# Patient Record
Sex: MALE | Race: WHITE | NOT HISPANIC OR LATINO | Employment: OTHER | RURAL
[De-identification: names, ages, dates, MRNs, and addresses within clinical notes are randomized per-mention and may not be internally consistent; named-entity substitution may affect disease eponyms.]

---

## 2017-01-20 ENCOUNTER — LAB (OUTPATIENT)
Dept: FAMILY MEDICINE CLINIC | Facility: CLINIC | Age: 67
End: 2017-01-20

## 2017-01-20 DIAGNOSIS — Z23 NEED FOR PNEUMOCOCCAL VACCINATION: ICD-10-CM

## 2017-01-20 DIAGNOSIS — L71.9 ROSACEA: ICD-10-CM

## 2017-01-20 DIAGNOSIS — Z12.5 PROSTATE CANCER SCREENING: ICD-10-CM

## 2017-01-20 DIAGNOSIS — D69.6 THROMBOCYTOPENIA (HCC): ICD-10-CM

## 2017-01-20 DIAGNOSIS — E78.5 HYPERLIPIDEMIA: ICD-10-CM

## 2017-01-20 LAB
ALBUMIN SERPL-MCNC: 4.7 G/DL (ref 3.4–4.8)
ALBUMIN/GLOB SERPL: 1.8 G/DL (ref 1.5–2.5)
ALP SERPL-CCNC: 44 U/L (ref 46–116)
ALT SERPL W P-5'-P-CCNC: 32 U/L (ref 10–44)
ANION GAP SERPL CALCULATED.3IONS-SCNC: 12.2 MMOL/L (ref 3.6–11.2)
AST SERPL-CCNC: 36 U/L (ref 10–34)
BASOPHILS # BLD AUTO: 0.01 10*3/MM3 (ref 0–0.3)
BASOPHILS NFR BLD AUTO: 0.2 % (ref 0–2)
BILIRUB SERPL-MCNC: 0.8 MG/DL (ref 0.2–1.8)
BUN BLD-MCNC: 20 MG/DL (ref 7–21)
BUN/CREAT SERPL: 21.7 (ref 7–25)
CALCIUM SPEC-SCNC: 9.6 MG/DL (ref 7.7–10)
CHLORIDE SERPL-SCNC: 107 MMOL/L (ref 99–112)
CHOLEST SERPL-MCNC: 151 MG/DL (ref 0–200)
CO2 SERPL-SCNC: 28.8 MMOL/L (ref 24.3–31.9)
CREAT BLD-MCNC: 0.92 MG/DL (ref 0.43–1.29)
DEPRECATED RDW RBC AUTO: 45.1 FL (ref 37–54)
EOSINOPHIL # BLD AUTO: 0.16 10*3/MM3 (ref 0–0.7)
EOSINOPHIL NFR BLD AUTO: 3.2 % (ref 0–7)
ERYTHROCYTE [DISTWIDTH] IN BLOOD BY AUTOMATED COUNT: 12.8 % (ref 11.5–14.5)
GFR SERPL CREATININE-BSD FRML MDRD: 83 ML/MIN/1.73
GLOBULIN UR ELPH-MCNC: 2.6 GM/DL
GLUCOSE BLD-MCNC: 83 MG/DL (ref 70–110)
HCT VFR BLD AUTO: 42.5 % (ref 42–52)
HDLC SERPL-MCNC: 69 MG/DL (ref 60–100)
HGB BLD-MCNC: 13.9 G/DL (ref 14–18)
IMM GRANULOCYTES # BLD: 0.01 10*3/MM3 (ref 0–0.03)
IMM GRANULOCYTES NFR BLD: 0.2 % (ref 0–0.5)
LDLC SERPL CALC-MCNC: 71 MG/DL (ref 0–100)
LDLC/HDLC SERPL: 1.02 {RATIO}
LYMPHOCYTES # BLD AUTO: 1.5 10*3/MM3 (ref 1–3)
LYMPHOCYTES NFR BLD AUTO: 29.8 % (ref 16–46)
MCH RBC QN AUTO: 31.7 PG (ref 27–33)
MCHC RBC AUTO-ENTMCNC: 32.7 G/DL (ref 33–37)
MCV RBC AUTO: 97 FL (ref 80–94)
MONOCYTES # BLD AUTO: 0.42 10*3/MM3 (ref 0.1–0.9)
MONOCYTES NFR BLD AUTO: 8.3 % (ref 0–12)
NEUTROPHILS # BLD AUTO: 2.93 10*3/MM3 (ref 1.4–6.5)
NEUTROPHILS NFR BLD AUTO: 58.3 % (ref 40–75)
OSMOLALITY SERPL CALC.SUM OF ELEC: 296 MOSM/KG (ref 273–305)
PLATELET # BLD AUTO: 89 10*3/MM3 (ref 130–400)
PMV BLD AUTO: 11.7 FL (ref 6–10)
POTASSIUM BLD-SCNC: 4.1 MMOL/L (ref 3.5–5.3)
PROT SERPL-MCNC: 7.3 G/DL (ref 6–8)
PSA SERPL-MCNC: 0.77 NG/ML (ref 0–4)
RBC # BLD AUTO: 4.38 10*6/MM3 (ref 4.7–6.1)
SODIUM BLD-SCNC: 148 MMOL/L (ref 135–153)
TRIGL SERPL-MCNC: 57 MG/DL (ref 0–150)
TSH SERPL DL<=0.05 MIU/L-ACNC: 1.52 MIU/ML (ref 0.55–4.78)
VLDLC SERPL-MCNC: 11.4 MG/DL
WBC NRBC COR # BLD: 5.03 10*3/MM3 (ref 4.5–12.5)

## 2017-01-20 PROCEDURE — 84153 ASSAY OF PSA TOTAL: CPT | Performed by: FAMILY MEDICINE

## 2017-01-20 PROCEDURE — 84443 ASSAY THYROID STIM HORMONE: CPT | Performed by: FAMILY MEDICINE

## 2017-01-20 PROCEDURE — 80061 LIPID PANEL: CPT | Performed by: FAMILY MEDICINE

## 2017-01-20 PROCEDURE — 80053 COMPREHEN METABOLIC PANEL: CPT | Performed by: FAMILY MEDICINE

## 2017-01-20 PROCEDURE — 36415 COLL VENOUS BLD VENIPUNCTURE: CPT | Performed by: FAMILY MEDICINE

## 2017-01-20 PROCEDURE — 85025 COMPLETE CBC W/AUTO DIFF WBC: CPT | Performed by: FAMILY MEDICINE

## 2017-01-25 ENCOUNTER — OFFICE VISIT (OUTPATIENT)
Dept: FAMILY MEDICINE CLINIC | Facility: CLINIC | Age: 67
End: 2017-01-25

## 2017-01-25 VITALS
HEIGHT: 70 IN | WEIGHT: 169.5 LBS | DIASTOLIC BLOOD PRESSURE: 76 MMHG | SYSTOLIC BLOOD PRESSURE: 122 MMHG | HEART RATE: 79 BPM | BODY MASS INDEX: 24.26 KG/M2 | TEMPERATURE: 97.1 F

## 2017-01-25 DIAGNOSIS — D64.9 ANEMIA, UNSPECIFIED TYPE: ICD-10-CM

## 2017-01-25 DIAGNOSIS — Z00.00 INITIAL MEDICARE ANNUAL WELLNESS VISIT: Primary | ICD-10-CM

## 2017-01-25 DIAGNOSIS — L71.9 ROSACEA: ICD-10-CM

## 2017-01-25 DIAGNOSIS — Z12.5 SCREENING FOR PROSTATE CANCER: ICD-10-CM

## 2017-01-25 DIAGNOSIS — D69.6 THROMBOCYTOPENIA (HCC): ICD-10-CM

## 2017-01-25 DIAGNOSIS — Z23 NEED FOR INFLUENZA VACCINATION: ICD-10-CM

## 2017-01-25 DIAGNOSIS — E78.2 MIXED HYPERLIPIDEMIA: ICD-10-CM

## 2017-01-25 PROCEDURE — G0008 ADMIN INFLUENZA VIRUS VAC: HCPCS | Performed by: FAMILY MEDICINE

## 2017-01-25 PROCEDURE — 99214 OFFICE O/P EST MOD 30 MIN: CPT | Performed by: FAMILY MEDICINE

## 2017-01-25 PROCEDURE — 90686 IIV4 VACC NO PRSV 0.5 ML IM: CPT | Performed by: FAMILY MEDICINE

## 2017-01-25 PROCEDURE — 96160 PT-FOCUSED HLTH RISK ASSMT: CPT | Performed by: FAMILY MEDICINE

## 2017-01-25 PROCEDURE — G0438 PPPS, INITIAL VISIT: HCPCS | Performed by: FAMILY MEDICINE

## 2017-01-25 RX ORDER — DOCUSATE SODIUM 250 MG
240 CAPSULE ORAL DAILY
COMMUNITY

## 2017-01-25 RX ORDER — SIMVASTATIN 20 MG
20 TABLET ORAL NIGHTLY
Qty: 90 TABLET | Refills: 3 | Status: SHIPPED | OUTPATIENT
Start: 2017-01-25 | End: 2018-02-05 | Stop reason: SDUPTHER

## 2017-01-25 NOTE — MR AVS SNAPSHOT
Jem Crowder   1/25/2017 9:00 AM   Office Visit    Dept Phone:  [phone]   Encounter #:  [id number]    Provider:  Marek Khanna MD   Department:  [facility] FAMILY MEDICINE                Your Full Care Plan              Today's Medication Changes          These changes are accurate as of: 1/25/17 10:10 AM.  If you have any questions, ask your nurse or doctor.               Medication(s)that have changed:     metroNIDAZOLE 0.75 % cream   Commonly known as:  METROCREAM   Apply  topically 2 (Two) Times a Day.   What changed:  when to take this   Changed by:  Marek Khanna MD            Where to Get Your Medications      These medications were sent to [place], [place] - [address] - [phone] [place] [phone]   [address] [facility] [address]     Phone:  [phone]     metroNIDAZOLE 0.75 % cream    simvastatin 20 MG tablet                  Your Updated Medication List          This list is accurate as of: 1/25/17 10:10 AM.  Always use your most recent med list.                aspirin 81 MG tablet       BENADRYL 25 MG tablet   Generic drug:  diphenhydrAMINE       docusate sodium 250 MG capsule   Commonly known as:  COLACE       metroNIDAZOLE 0.75 % cream   Commonly known as:  METROCREAM   Apply  topically 2 (Two) Times a Day.       MULTI VITAMIN MENS tablet       simvastatin 20 MG tablet   Commonly known as:  ZOCOR   Take 1 tablet by mouth Every Night.               We Performed the Following     Ambulatory Referral to Hematology     Flu Vaccine Greater Than or Equal To 4yo Preservative Free IM       You Were Diagnosed With        Codes Comments    Need for influenza vaccination    -  Primary ICD-10-CM: Z23  ICD-9-CM: V04.81     Mixed hyperlipidemia     ICD-10-CM: E78.2  ICD-9-CM: 272.2     Rosacea     ICD-10-CM: L71.9  ICD-9-CM: 695.3     Screening for prostate cancer     ICD-10-CM: Z12.5  ICD-9-CM: V76.44     "Thrombocytopenia     ICD-10-CM: D69.6  ICD-9-CM: 287.5     Anemia, unspecified type     ICD-10-CM: D64.9  ICD-9-CM: 285.9       Instructions     None    Patient Instructions History      Upcoming Appointments     Visit Type Date Time Department    FOLLOW UP 1/25/2017  9:00 AM [facility]    LAB 7/21/2017  8:40 AM [facility]    [facility]      MyChart Signup     Our records indicate that you have declined [facility] PROnoisehart signup. If you would like to sign up for PROnoisehart, please email [email or web address] or call [phone] to obtain an activation code.             Other Info from Your Visit           Your Appointments     Jul 21, 2017  8:40 AM EDT   Lab with LAB KIANNA DAVENPORT   [facility] (--)    [address] [address]   [phone]            Jul 26, 2017  9:00 AM EDT   Follow Up with Marek Khanna MD   [facility] (--)    [address] [address]   [phone]           Arrive 15 minutes prior to appointment.              Allergies     Penicillins        Reason for Visit     Hyperlipidemia 6 month follow up    Med Refill Simvastatin, Metronidazol      Vital Signs     Blood Pressure Pulse Temperature Height Weight Body Mass Index    122/76 (BP Location: Left arm, Patient Position: Sitting) 79 97.1 °F (36.2 °C) (Oral) 70\" (177.8 cm) 169 lb 8 oz (76.9 kg) 24.32 kg/m2    Smoking Status                   Never Smoker           Problems and Diagnoses Noted     High cholesterol or triglycerides    Acne rosacea    Decreased platelet count    Needs flu shot    -  Primary    Screening for prostate cancer        Anemia          Immunizations Administered     Name Date    Influenza (IM) Preservative Free         "

## 2017-01-25 NOTE — PROGRESS NOTES
Subjective   Jem Crowder is a 66 y.o. male.     History of Present Illness comes in regarding medication management follow-up on recent laboratory.  Hyperlipidemia.  Generally having no new problems.  Trying to stay active.  Is compliant with diet.  Has had some intentional weight loss.  Globally doing really well.    The following portions of the patient's history were reviewed and updated as appropriate: allergies, current medications, past medical history, past social history, past surgical history and problem list.    Review of Systems   Constitutional: Negative.    HENT: Negative.    Eyes: Negative.    Respiratory: Negative.    Cardiovascular: Negative.    Gastrointestinal: Negative.    Endocrine: Negative.    Genitourinary: Negative.    Musculoskeletal: Negative.    Skin: Negative.    Allergic/Immunologic: Negative.    Neurological: Negative.    Hematological: Negative.    Psychiatric/Behavioral: Negative.        Objective   Physical Exam   Constitutional: He is oriented to person, place, and time. He appears well-developed and well-nourished.   HENT:   Head: Normocephalic.   Right Ear: External ear normal.   Left Ear: External ear normal.   Mouth/Throat: Oropharynx is clear and moist.   Eyes: Conjunctivae and EOM are normal. Pupils are equal, round, and reactive to light.   Neck: Normal range of motion. Neck supple. No tracheal deviation present. No thyromegaly present.   Cardiovascular: Normal rate, regular rhythm, normal heart sounds and intact distal pulses.    No murmur heard.  Pulmonary/Chest: Effort normal and breath sounds normal.   Stable kyphosis     Abdominal: Soft. Bowel sounds are normal. There is no tenderness.   Genitourinary: Rectum normal and prostate normal.   Musculoskeletal: Normal range of motion. He exhibits no edema.   Lymphadenopathy:     He has no cervical adenopathy.   Neurological: He is alert and oriented to person, place, and time.   Skin: Skin is warm and dry.   Psychiatric: He  "has a normal mood and affect.   Vitals reviewed.    Visit Vitals   • /76 (BP Location: Left arm, Patient Position: Sitting)   • Pulse 79   • Temp 97.1 °F (36.2 °C) (Oral)   • Ht 70\" (177.8 cm)   • Wt 169 lb 8 oz (76.9 kg)   • BMI 24.32 kg/m2     Assessment/Plan   Problems Addressed this Visit        Cardiovascular and Mediastinum    Hyperlipidemia    Relevant Medications    simvastatin (ZOCOR) 20 MG tablet    Other Relevant Orders    Lipid Panel       Musculoskeletal and Integument    Rosacea       Hematopoietic and Hemostatic    Thrombocytopenia    Relevant Orders    Ambulatory Referral to Hematology    CBC & Differential    Comprehensive Metabolic Panel      Other Visit Diagnoses     Need for influenza vaccination    -  Primary    Relevant Orders    Flu Vaccine Greater Than or Equal To 4yo Preservative Free IM (Completed)    Screening for prostate cancer        Anemia, unspecified type        Relevant Orders    Comprehensive Metabolic Panel        We reviewed the recent metabolic parameters.  Those are acceptable.  CBC noted with a mildly progressive thrombocytopenia.  Very mild anemia also.  Overall otherwise things seem to be stable.  We'll continue all medications as currently ordered reconciled.  Will go ahead and refer back to hematology for another evaluation follow-up especially in light of the mild progression with the mild anemia.  Recheck here in roughly 6 months with lab prior for follow-up metabolic monitoring.  Otherwise as needed.         "

## 2017-01-25 NOTE — PROGRESS NOTES
QUICK REFERENCE INFORMATION:  The ABCs of the Annual Wellness Visit    Initial Medicare Wellness Visit    HEALTH RISK ASSESSMENT    Recent Hospitalizations:  No recent hospitalization(s)..        Current Medical Providers:  Patient Care Team:  Marek Khanna MD as PCP - General  No Known Provider as PCP - Family Medicine        Smoking Status:  History   Smoking Status   • Never Smoker   Smokeless Tobacco   • Former User   • Types: Chew   • Quit date: 7/21/2000       Alcohol Consumption:  History   Alcohol Use No       Depression Screen:   PHQ-9 Depression Screening 1/25/2017   Little interest or pleasure in doing things 0   Feeling down, depressed, or hopeless 0   Trouble falling or staying asleep, or sleeping too much 0   Feeling tired or having little energy 0   Poor appetite or overeating 0   Feeling bad about yourself - or that you are a failure or have let yourself or your family down 0   Trouble concentrating on things, such as reading the newspaper or watching television 0   Moving or speaking so slowly that other people could have noticed. Or the opposite - being so fidgety or restless that you have been moving around a lot more than usual 0   Thoughts that you would be better off dead, or of hurting yourself in some way 0   PHQ-9 Total Score 0       Health Habits and Functional and Cognitive Screening:  Functional & Cognitive Status 1/25/2017   Do you have difficulty preparing food and eating? No   Do you have difficulty bathing yourself? No   Do you have difficulty getting dressed? No   Do you have difficulty using the toilet? No   Do you have difficulty moving around from place to place? No   In the past year have you fallen or experienced a near fall? No   Do you need help using the phone?  No   Are you deaf or do you have serious difficulty hearing?  No   Do you need help with transportation? No   Do you need help shopping? No   Do you need help preparing meals?  No   Do you need help with  housework?  No   Do you need help with laundry? No   Do you need help taking your medications? No   Do you need help managing money? No   Do you have difficulty concentrating, remembering or making decisions? No                  Does the patient have evidence of cognitive impairment? No    Asprin use counseling:yes    Finger Rub Hearing Test (right ear):passed  Finger Rub Hearing Test (left ear):passed    Recent Lab Results:    Visual Acuity:  No exam data present    Age-appropriate Screening Schedule:  Refer to the list below for future screening recommendations based on patient's age, sex and/or medical conditions. Orders for these recommended tests are listed in the plan section. The patient has been provided with a written plan.    Health Maintenance   Topic Date Due   • INFLUENZA VACCINE  08/01/2016   • PNEUMOCOCCAL VACCINES (65+ LOW/MEDIUM RISK) (2 of 2 - PPSV23) 07/21/2017   • LIPID PANEL  01/20/2018   • TDAP/TD VACCINES (2 - Td) 07/19/2022   • COLONOSCOPY  10/01/2025   • ZOSTER VACCINE  Completed        Subjective   History of Present Illness    Jem Crowder is a 66 y.o. male who presents for an Annual Wellness Visit. In addition, we addressed the following health issues:     The following portions of the patient's history were reviewed and updated as appropriate: allergies, current medications, past medical history, past social history, past surgical history and problem list.    Outpatient Medications Prior to Visit   Medication Sig Dispense Refill   • aspirin 81 MG tablet Take 81 mg by mouth daily.     • diphenhydrAMINE (BENADRYL) 25 MG tablet Take 50 mg by mouth at night as needed for itching or sleep.     • Multiple Vitamin (MULTI VITAMIN MENS) tablet Take 1 tablet by mouth daily.     • metroNIDAZOLE (METROCREAM) 0.75 % cream Apply  topically.     • simvastatin (ZOCOR) 20 MG tablet Take 20 mg by mouth every night.       No facility-administered medications prior to visit.        Patient Active Problem  "List   Diagnosis   • Chronic coronary artery disease   • Diverticulosis of sigmoid colon   • Hyperlipidemia   • Internal hemorrhoids   • Rosacea   • Thrombocytopenia       Advanced Care Planning:  has an advanced directive - a copy has been provided and is in file    Identification of Risk Factors:  Risk factors include: none.    Review of Systems    Compared to one year ago, the patient feels his physical health is the same.  Compared to one year ago, the patient feels his mental health is the same.    Objective     Physical Exam    Vitals:    01/25/17 0906   BP: 122/76   BP Location: Left arm   Patient Position: Sitting   Pulse: 79   Temp: 97.1 °F (36.2 °C)   TempSrc: Oral   Weight: 169 lb 8 oz (76.9 kg)   Height: 70\" (177.8 cm)       Body mass index is 24.32 kg/(m^2).  Discussed the patient's BMI with him. The BMI is in the acceptable range.    Assessment/Plan   Patient Self-Management and Personalized Health Advice  The patient has been provided with information about: n/a and preventive services including:   · Influenza vaccine, Nutrition counseling provided, Prostate cancer screening discussed.    Visit Diagnoses:    ICD-10-CM ICD-9-CM   1. Need for influenza vaccination Z23 V04.81   2. Mixed hyperlipidemia E78.2 272.2   3. Rosacea L71.9 695.3   4. Screening for prostate cancer Z12.5 V76.44   5. Thrombocytopenia D69.6 287.5   6. Anemia, unspecified type D64.9 285.9       Orders Placed This Encounter   Procedures   • Flu Vaccine Greater Than or Equal To 4yo Preservative Free IM   • Comprehensive Metabolic Panel     Standing Status:   Future     Standing Expiration Date:   1/25/2018   • Lipid Panel     Standing Status:   Future     Standing Expiration Date:   1/25/2018   • Ambulatory Referral to Hematology     Referral Priority:   Routine     Referral Type:   Consultation     Referral Reason:   Specialty Services Required     Requested Specialty:   Hematology     Number of Visits Requested:   1       Outpatient " Encounter Prescriptions as of 1/25/2017   Medication Sig Dispense Refill   • aspirin 81 MG tablet Take 81 mg by mouth daily.     • diphenhydrAMINE (BENADRYL) 25 MG tablet Take 50 mg by mouth at night as needed for itching or sleep.     • docusate sodium (COLACE) 250 MG capsule Take 250 mg by mouth Daily.     • metroNIDAZOLE (METROCREAM) 0.75 % cream Apply  topically 2 (Two) Times a Day. 45 g 3   • Multiple Vitamin (MULTI VITAMIN MENS) tablet Take 1 tablet by mouth daily.     • simvastatin (ZOCOR) 20 MG tablet Take 1 tablet by mouth Every Night. 90 tablet 3   • [DISCONTINUED] metroNIDAZOLE (METROCREAM) 0.75 % cream Apply  topically.     • [DISCONTINUED] simvastatin (ZOCOR) 20 MG tablet Take 20 mg by mouth every night.       No facility-administered encounter medications on file as of 1/25/2017.        Reviewed use of high risk medication in the elderly: not applicable  Reviewed for potential of harmful drug interactions in the elderly: yes    Follow Up:  Return in about 6 months (around 7/25/2017) for Recheck lab prior.     An After Visit Summary and PPPS with all of these plans were given to the patient.

## 2017-01-26 ENCOUNTER — TELEPHONE (OUTPATIENT)
Dept: FAMILY MEDICINE CLINIC | Facility: CLINIC | Age: 67
End: 2017-01-26

## 2017-01-26 RX ORDER — METRONIDAZOLE 7.5 MG/G
GEL TOPICAL 2 TIMES DAILY
Qty: 45 G | Refills: 2 | Status: SHIPPED | OUTPATIENT
Start: 2017-01-26 | End: 2020-08-24 | Stop reason: SDUPTHER

## 2017-01-26 NOTE — TELEPHONE ENCOUNTER
PATIENT STATES HE NORMALY USEDS METRONIDAZOLE GEL BUT THE CREAM WAS SENT TO PHARMACY. JUST WONDERING IF YOU WERE WANTING TO CHANGE IT? HE PREFERS THE GEL.

## 2017-01-30 ENCOUNTER — RESULTS ENCOUNTER (OUTPATIENT)
Dept: FAMILY MEDICINE CLINIC | Facility: CLINIC | Age: 67
End: 2017-01-30

## 2017-01-30 DIAGNOSIS — D69.6 THROMBOCYTOPENIA (HCC): ICD-10-CM

## 2017-01-30 DIAGNOSIS — D64.9 ANEMIA, UNSPECIFIED TYPE: ICD-10-CM

## 2017-01-30 DIAGNOSIS — E78.2 MIXED HYPERLIPIDEMIA: ICD-10-CM

## 2017-03-13 ENCOUNTER — CONSULT (OUTPATIENT)
Dept: ONCOLOGY | Facility: CLINIC | Age: 67
End: 2017-03-13

## 2017-03-13 ENCOUNTER — LAB (OUTPATIENT)
Dept: ONCOLOGY | Facility: CLINIC | Age: 67
End: 2017-03-13

## 2017-03-13 VITALS
BODY MASS INDEX: 24.65 KG/M2 | RESPIRATION RATE: 18 BRPM | WEIGHT: 172.2 LBS | HEART RATE: 77 BPM | OXYGEN SATURATION: 98 % | SYSTOLIC BLOOD PRESSURE: 109 MMHG | TEMPERATURE: 97.5 F | DIASTOLIC BLOOD PRESSURE: 66 MMHG | HEIGHT: 70 IN

## 2017-03-13 DIAGNOSIS — D69.6 THROMBOCYTOPENIA (HCC): ICD-10-CM

## 2017-03-13 DIAGNOSIS — D69.6 THROMBOCYTOPENIA (HCC): Primary | ICD-10-CM

## 2017-03-13 LAB
BASOPHILS # BLD AUTO: 0.01 10*3/MM3 (ref 0–0.3)
BASOPHILS NFR BLD AUTO: 0.2 % (ref 0–2)
CRP SERPL-MCNC: <0.5 MG/DL (ref 0–0.99)
DEPRECATED RDW RBC AUTO: 44.3 FL (ref 37–54)
EOSINOPHIL # BLD AUTO: 0.07 10*3/MM3 (ref 0–0.7)
EOSINOPHIL NFR BLD AUTO: 1.6 % (ref 0–7)
ERYTHROCYTE [DISTWIDTH] IN BLOOD BY AUTOMATED COUNT: 12.6 % (ref 11.5–14.5)
ERYTHROCYTE [SEDIMENTATION RATE] IN BLOOD: 7 MM/HR (ref 0–20)
FOLATE SERPL-MCNC: >24 NG/ML (ref 5.4–20)
HCT VFR BLD AUTO: 41.1 % (ref 42–52)
HGB BLD-MCNC: 13.7 G/DL (ref 14–18)
IMM GRANULOCYTES # BLD: 0 10*3/MM3 (ref 0–0.03)
IMM GRANULOCYTES NFR BLD: 0 % (ref 0–0.5)
LYMPHOCYTES # BLD AUTO: 1.39 10*3/MM3 (ref 1–3)
LYMPHOCYTES NFR BLD AUTO: 32.2 % (ref 16–46)
MCH RBC QN AUTO: 32.5 PG (ref 27–33)
MCHC RBC AUTO-ENTMCNC: 33.3 G/DL (ref 33–37)
MCV RBC AUTO: 97.4 FL (ref 80–94)
MONOCYTES # BLD AUTO: 0.35 10*3/MM3 (ref 0.1–0.9)
MONOCYTES NFR BLD AUTO: 8.1 % (ref 0–12)
NEUTROPHILS # BLD AUTO: 2.5 10*3/MM3 (ref 1.4–6.5)
NEUTROPHILS NFR BLD AUTO: 57.9 % (ref 40–75)
PLATELET # BLD AUTO: 188 10*3/MM3 (ref 130–400)
PLATELET # BLD AUTO: 206 10*3/MM3 (ref 130–400)
PLATELET # BLD AUTO: 88 10*3/MM3 (ref 130–400)
PMV BLD AUTO: 10.3 FL (ref 6–10)
RBC # BLD AUTO: 4.22 10*6/MM3 (ref 4.7–6.1)
VIT B12 BLD-MCNC: 553 PG/ML (ref 211–911)
WBC NRBC COR # BLD: 4.32 10*3/MM3 (ref 4.5–12.5)

## 2017-03-13 PROCEDURE — 99204 OFFICE O/P NEW MOD 45 MIN: CPT | Performed by: INTERNAL MEDICINE

## 2017-03-13 PROCEDURE — 82607 VITAMIN B-12: CPT | Performed by: INTERNAL MEDICINE

## 2017-03-13 PROCEDURE — 85652 RBC SED RATE AUTOMATED: CPT | Performed by: INTERNAL MEDICINE

## 2017-03-13 PROCEDURE — 36415 COLL VENOUS BLD VENIPUNCTURE: CPT | Performed by: INTERNAL MEDICINE

## 2017-03-13 PROCEDURE — 86038 ANTINUCLEAR ANTIBODIES: CPT | Performed by: INTERNAL MEDICINE

## 2017-03-13 PROCEDURE — 85060 BLOOD SMEAR INTERPRETATION: CPT | Performed by: INTERNAL MEDICINE

## 2017-03-13 PROCEDURE — 86140 C-REACTIVE PROTEIN: CPT | Performed by: INTERNAL MEDICINE

## 2017-03-13 PROCEDURE — 82746 ASSAY OF FOLIC ACID SERUM: CPT | Performed by: INTERNAL MEDICINE

## 2017-03-13 PROCEDURE — 85025 COMPLETE CBC W/AUTO DIFF WBC: CPT | Performed by: INTERNAL MEDICINE

## 2017-03-13 PROCEDURE — 85049 AUTOMATED PLATELET COUNT: CPT | Performed by: INTERNAL MEDICINE

## 2017-03-13 NOTE — PROGRESS NOTES
Jem Crowder  [id number]  1950  3/13/2017      Referring Provider:   Marek Khanna    Reason for Consultation:   Thrombocytopenia     Chief Complaint:  Thrombocytopenia      History of Present Illness:  Jem Crowder is a very pleasant 66 y.o.  male who presents in new consultation at the request of Marek Khanna, for further management of thrombocytopenia.     Mr. Crowder reports that he has had thrombocytopenia for some time and was in fact evaluated by hematologist Dr. Mayfield in 2014. At that time he was on daily doxycycline for acne rosacea and it was felt that his thrombocytopenia could be monitored. He states that he was previously on Lipitor for elevated cholesterol and was later changed to simvastatin due to insurance issues. He believes that his thrombocytopenia began when he started simvastatin. He does take a daily multivitamin otherwise he denies of any NSAID with exception of 81mg of Aspirin daily. He also denies of any other vitamin or herbal medication use. He does report easy bruising however he is on Asprin 81 mg daily and denies of any abnormal or spontaneous bleeding. He denies of any history of blood transfusion. He denies of any fevers, weight loss, night sweats or lymphadenopathy. He reports feeling well otherwise. In review of his complete blood counts in our system it appears that this has been occurring since 1/2015 and in fact normalized in 8/2015 and again began occurring in 1/2016. The platelet counts during that period have ranged from 77 to 199 thousand.       The following portions of the patient's history were reviewed and updated as appropriate: allergies, current medications, past family history, past medical history, past social history, past surgical history and problem list.    Allergies   Allergen Reactions   • Penicillins        Past Medical History   Diagnosis Date   • Coronary artery disease    • Diverticulitis    • Rosacea        Past Surgical  History   Procedure Laterality Date   • Colonoscopy  10/01/2015       Social History     Social History   • Marital status:      Spouse name: N/A   • Number of children: N/A   • Years of education: N/A     Occupational History   • Not on file.     Social History Main Topics   • Smoking status: Never Smoker   • Smokeless tobacco: Former User     Types: Chew     Quit date: 7/21/2000   • Alcohol use No   • Drug use: No   • Sexual activity: Not on file     Other Topics Concern   • Not on file     Social History Narrative   He currently lives by himself he does have one son who lives in [place]. He is currently retired.    Family History   Problem Relation Age of Onset   • Heart disease Mother    • Hypertension Mother    • Lung disease Father      Black Lung   • Stomach cancer Father    • Cancer Brother          Current Outpatient Prescriptions:   •  aspirin 81 MG tablet, Take 81 mg by mouth daily., Disp: , Rfl:   •  diphenhydrAMINE (BENADRYL) 25 MG tablet, Take 50 mg by mouth at night as needed for itching or sleep., Disp: , Rfl:   •  docusate sodium (COLACE) 250 MG capsule, Take 250 mg by mouth Daily., Disp: , Rfl:   •  metroNIDAZOLE (METROGEL) 0.75 % gel, Apply  topically 2 (Two) Times a Day., Disp: 45 g, Rfl: 2  •  Multiple Vitamin (MULTI VITAMIN MENS) tablet, Take 1 tablet by mouth daily., Disp: , Rfl:   •  simvastatin (ZOCOR) 20 MG tablet, Take 1 tablet by mouth Every Night., Disp: 90 tablet, Rfl: 3        Review of Systems  A comprehensive 14 point review of systems was conducted with patient and positive as per HPI otherwise negative. +occasional seasonal allergies and intermittent sinus drainage      Physical Exam  Vital Signs: These were reviewed and listed as per patient’s electronic medical chart  Vitals:    03/13/17 1346   BP: 109/66   Pulse: 77   Resp: 18   Temp: 97.5 °F (36.4 °C)   SpO2: 98%     General: Awake, alert and oriented, in no distress  HEENT: Head is atraumatic, normocephalic,  extraocular movements full, oropharynx clear, no scleral icterus, pink moist mucous membranes  Neck: supple, no jvd, lymphadenopathy or masses  Cardiovascular: regular rate and rhythm without murmurs, rubs or gallops  Pulmonary: non-labored, clear to auscultation bilaterally, no wheezing  Abdomen: soft, non-tender, non-distended, normal active bowel sounds present, no organomegaly  Extremities: No clubbing, cyanosis or edema  Lymph: No cervical, supraclavicular, axillary, adenopathy  Neurologic: Mental status as above, alert, awake and oriented, grossly non-focal exam  Skin: warm, dry, intact        Labs / Studies:    Consult on 03/13/2017   Component Date Value   • Vitamin B-12 03/13/2017 553    • C-Reactive Protein 03/13/2017 <0.50    • Sed Rate 03/13/2017 7    • Folate 03/13/2017 >24.00*   • Platelets 03/13/2017 88*   • Platelets 03/13/2017 188    • WBC 03/13/2017 4.32*   • RBC 03/13/2017 4.22*   • Hemoglobin 03/13/2017 13.7*   • Hematocrit 03/13/2017 41.1*   • MCV 03/13/2017 97.4*   • MCH 03/13/2017 32.5    • MCHC 03/13/2017 33.3    • RDW 03/13/2017 12.6    • RDW-SD 03/13/2017 44.3    • MPV 03/13/2017 10.3*   • Platelets 03/13/2017 206    • Neutrophil % 03/13/2017 57.9    • Lymphocyte % 03/13/2017 32.2    • Monocyte % 03/13/2017 8.1    • Eosinophil % 03/13/2017 1.6    • Basophil % 03/13/2017 0.2    • Immature Grans % 03/13/2017 0.0    • Neutrophils, Absolute 03/13/2017 2.50    • Lymphocytes, Absolute 03/13/2017 1.39    • Monocytes, Absolute 03/13/2017 0.35    • Eosinophils, Absolute 03/13/2017 0.07    • Basophils, Absolute 03/13/2017 0.01    • Immature Grans, Absolute 03/13/2017 0.00    Lab on 01/20/2017   Component Date Value   • Glucose 01/20/2017 83    • BUN 01/20/2017 20    • Creatinine 01/20/2017 0.92    • Sodium 01/20/2017 148    • Potassium 01/20/2017 4.1    • Chloride 01/20/2017 107    • CO2 01/20/2017 28.8    • Calcium 01/20/2017 9.6    • Total Protein 01/20/2017 7.3    • Albumin 01/20/2017 4.70    •  ALT (SGPT) 01/20/2017 32    • AST (SGOT) 01/20/2017 36*   • Alkaline Phosphatase 01/20/2017 44*   • Total Bilirubin 01/20/2017 0.8    • eGFR Non  Amer 01/20/2017 83    • Globulin 01/20/2017 2.6    • A/G Ratio 01/20/2017 1.8    • BUN/Creatinine Ratio 01/20/2017 21.7    • Anion Gap 01/20/2017 12.2*   • Total Cholesterol 01/20/2017 151    • Triglycerides 01/20/2017 57    • HDL Cholesterol 01/20/2017 69    • LDL Cholesterol  01/20/2017 71    • VLDL Cholesterol 01/20/2017 11.4    • LDL/HDL Ratio 01/20/2017 1.02    • PSA 01/20/2017 0.770    • TSH 01/20/2017 1.517    • WBC 01/20/2017 5.03    • RBC 01/20/2017 4.38*   • Hemoglobin 01/20/2017 13.9*   • Hematocrit 01/20/2017 42.5    • MCV 01/20/2017 97.0*   • MCH 01/20/2017 31.7    • MCHC 01/20/2017 32.7*   • RDW 01/20/2017 12.8    • RDW-SD 01/20/2017 45.1    • MPV 01/20/2017 11.7*   • Platelets 01/20/2017 89*   • Neutrophil % 01/20/2017 58.3    • Lymphocyte % 01/20/2017 29.8    • Monocyte % 01/20/2017 8.3    • Eosinophil % 01/20/2017 3.2    • Basophil % 01/20/2017 0.2    • Immature Grans % 01/20/2017 0.2    • Neutrophils, Absolute 01/20/2017 2.93    • Lymphocytes, Absolute 01/20/2017 1.50    • Monocytes, Absolute 01/20/2017 0.42    • Eosinophils, Absolute 01/20/2017 0.16    • Basophils, Absolute 01/20/2017 0.01    • Immature Grans, Absolute 01/20/2017 0.01    • Osmolality Calc 01/20/2017 296.0               Assessment/Plan   Jem Crowder is a very pleasant 66 y.o.  male who presents in new consultation at the request of Marek Khanna, for further management of thrombocytopenia.     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 I did also obtain a peripheral smear for further evaluation which is pending. I did  also assess for nutritional deficiencies that may be contributing such as B12 and folate which were normal. I will also assess for underlying inflammation which may be suppressing bone marrow which were also normal. Patient was also found to have some leukopenia will recheck complete blood count during his next visit. Given that thrombocytopenia appears to be resolved will continue to monitor.    I will have the patient return in follow up appointment to review test results in one month. He understands that should he have any questions or concerns prior to his appointment he should give us a call at any time and I would be happy to see him sooner. It was a pleasure to see this patient in clinic today, thank you for allowing me to participate in the care of this patient.    I spent 45 minutes in regards to this patient’s care today. More than 30 minutes of the time was spent in direct interaction with the patient for the above problems.        Pat Hooks MD  03/13/2017  5:22 PM

## 2017-03-14 LAB — ANA SER QL: NEGATIVE

## 2017-03-16 LAB
CYTOLOGIST CVX/VAG CYTO: NORMAL
PATH INTERP BLD-IMP: NORMAL

## 2017-04-11 ENCOUNTER — OFFICE VISIT (OUTPATIENT)
Dept: ONCOLOGY | Facility: CLINIC | Age: 67
End: 2017-04-11

## 2017-04-11 VITALS
WEIGHT: 169.7 LBS | DIASTOLIC BLOOD PRESSURE: 70 MMHG | TEMPERATURE: 97.2 F | BODY MASS INDEX: 24.35 KG/M2 | SYSTOLIC BLOOD PRESSURE: 132 MMHG | HEART RATE: 61 BPM | RESPIRATION RATE: 20 BRPM | OXYGEN SATURATION: 99 %

## 2017-04-11 DIAGNOSIS — D69.6 THROMBOCYTOPENIA (HCC): Primary | ICD-10-CM

## 2017-04-11 DIAGNOSIS — D72.819 LEUKOPENIA, UNSPECIFIED TYPE: ICD-10-CM

## 2017-04-11 DIAGNOSIS — R25.2 CRAMP OF BOTH LOWER EXTREMITIES: ICD-10-CM

## 2017-04-11 LAB
ANION GAP SERPL CALCULATED.3IONS-SCNC: 3.3 MMOL/L (ref 3.6–11.2)
BASOPHILS # BLD AUTO: 0.01 10*3/MM3 (ref 0–0.3)
BASOPHILS NFR BLD AUTO: 0.2 % (ref 0–2)
BUN BLD-MCNC: 20 MG/DL (ref 7–21)
BUN/CREAT SERPL: 23 (ref 7–25)
CALCIUM SPEC-SCNC: 9.6 MG/DL (ref 7.7–10)
CHLORIDE SERPL-SCNC: 106 MMOL/L (ref 99–112)
CO2 SERPL-SCNC: 31.7 MMOL/L (ref 24.3–31.9)
CREAT BLD-MCNC: 0.87 MG/DL (ref 0.43–1.29)
DEPRECATED RDW RBC AUTO: 45.3 FL (ref 37–54)
EOSINOPHIL # BLD AUTO: 0.11 10*3/MM3 (ref 0–0.7)
EOSINOPHIL NFR BLD AUTO: 2.4 % (ref 0–7)
ERYTHROCYTE [DISTWIDTH] IN BLOOD BY AUTOMATED COUNT: 12.8 % (ref 11.5–14.5)
GFR SERPL CREATININE-BSD FRML MDRD: 88 ML/MIN/1.73
GLUCOSE BLD-MCNC: 96 MG/DL (ref 70–110)
HCT VFR BLD AUTO: 42.1 % (ref 42–52)
HGB BLD-MCNC: 13.9 G/DL (ref 14–18)
IMM GRANULOCYTES # BLD: 0.01 10*3/MM3 (ref 0–0.03)
IMM GRANULOCYTES NFR BLD: 0.2 % (ref 0–0.5)
LYMPHOCYTES # BLD AUTO: 1.5 10*3/MM3 (ref 1–3)
LYMPHOCYTES NFR BLD AUTO: 32.5 % (ref 16–46)
MAGNESIUM SERPL-MCNC: 2.1 MG/DL (ref 1.7–2.6)
MCH RBC QN AUTO: 32 PG (ref 27–33)
MCHC RBC AUTO-ENTMCNC: 33 G/DL (ref 33–37)
MCV RBC AUTO: 97 FL (ref 80–94)
MONOCYTES # BLD AUTO: 0.33 10*3/MM3 (ref 0.1–0.9)
MONOCYTES NFR BLD AUTO: 7.1 % (ref 0–12)
NEUTROPHILS # BLD AUTO: 2.66 10*3/MM3 (ref 1.4–6.5)
NEUTROPHILS NFR BLD AUTO: 57.6 % (ref 40–75)
OSMOLALITY SERPL CALC.SUM OF ELEC: 283.7 MOSM/KG (ref 273–305)
PLATELET # BLD AUTO: 212 10*3/MM3 (ref 130–400)
PMV BLD AUTO: 10.2 FL (ref 6–10)
POTASSIUM BLD-SCNC: 4 MMOL/L (ref 3.5–5.3)
RBC # BLD AUTO: 4.34 10*6/MM3 (ref 4.7–6.1)
SODIUM BLD-SCNC: 141 MMOL/L (ref 135–153)
WBC NRBC COR # BLD: 4.62 10*3/MM3 (ref 4.5–12.5)

## 2017-04-11 PROCEDURE — 83735 ASSAY OF MAGNESIUM: CPT | Performed by: INTERNAL MEDICINE

## 2017-04-11 PROCEDURE — 80048 BASIC METABOLIC PNL TOTAL CA: CPT | Performed by: INTERNAL MEDICINE

## 2017-04-11 PROCEDURE — 99214 OFFICE O/P EST MOD 30 MIN: CPT | Performed by: INTERNAL MEDICINE

## 2017-04-11 PROCEDURE — 85025 COMPLETE CBC W/AUTO DIFF WBC: CPT | Performed by: INTERNAL MEDICINE

## 2017-04-11 NOTE — PROGRESS NOTES
Jem Crowder  [id number]  1950 4/11/2017      Referring Provider:   Marek Khanna    Reason for Follow up:   Thrombocytopenia     Chief Complaint:  Leg Cramps      History of Present Illness:  Jem Crowder is a very pleasant 66 y.o.  male who presents in new consultation at the request of Marek Khanna, for further management of thrombocytopenia.     Mr. Crowder reports that he has had thrombocytopenia for some time and was in fact evaluated by hematologist Dr. Mayfield in 2014. At that time he was on daily doxycycline for acne rosacea and it was felt that his thrombocytopenia could be monitored. He states that he was previously on Lipitor for elevated cholesterol and was later changed to simvastatin due to insurance issues. He believes that his thrombocytopenia began when he started simvastatin. He does take a daily multivitamin otherwise he denies of any NSAID with exception of 81mg of Aspirin daily. He also denies of any other vitamin or herbal medication use. He does report easy bruising however he is on Asprin 81 mg daily and denies of any abnormal or spontaneous bleeding. He denies of any history of blood transfusion. He denies of any fevers, weight loss, night sweats or lymphadenopathy. He reports feeling well otherwise. In review of his complete blood counts in our system it appears that this has been occurring since 1/2015 and in fact normalized in 8/2015 and again began occurring in 1/2016. The platelet counts during that period have ranged from 77 to 199 thousand.       The following portions of the patient's history were reviewed and updated as appropriate: allergies, current medications, past family history, past medical history, past social history, past surgical history and problem list.    Allergies   Allergen Reactions   • Penicillins        Past Medical History:   Diagnosis Date   • Coronary artery disease    • Diverticulitis    • Rosacea        Past Surgical History:    Procedure Laterality Date   • COLONOSCOPY  10/01/2015       Social History     Social History   • Marital status:      Spouse name: N/A   • Number of children: N/A   • Years of education: N/A     Occupational History   • Not on file.     Social History Main Topics   • Smoking status: Never Smoker   • Smokeless tobacco: Former User     Types: Chew     Quit date: 7/21/2000   • Alcohol use No   • Drug use: No   • Sexual activity: Not on file     Other Topics Concern   • Not on file     Social History Narrative   He currently lives by himself he does have one son who lives in [place]. He is currently retired.    Family History   Problem Relation Age of Onset   • Heart disease Mother    • Hypertension Mother    • Lung disease Father      Black Lung   • Stomach cancer Father    • Cancer Brother          Current Outpatient Prescriptions:   •  aspirin 81 MG tablet, Take 81 mg by mouth daily., Disp: , Rfl:   •  diphenhydrAMINE (BENADRYL) 25 MG tablet, Take 50 mg by mouth at night as needed for itching or sleep., Disp: , Rfl:   •  docusate sodium (COLACE) 250 MG capsule, Take 250 mg by mouth Daily., Disp: , Rfl:   •  metroNIDAZOLE (METROGEL) 0.75 % gel, Apply  topically 2 (Two) Times a Day., Disp: 45 g, Rfl: 2  •  Multiple Vitamin (MULTI VITAMIN MENS) tablet, Take 1 tablet by mouth daily., Disp: , Rfl:   •  simvastatin (ZOCOR) 20 MG tablet, Take 1 tablet by mouth Every Night., Disp: 90 tablet, Rfl: 3        Review of Systems  A comprehensive 14 point review of systems was conducted with patient and positive as per HPI otherwise negative. +occasional seasonal allergies and intermittent sinus drainage      Physical Exam  Vital Signs: These were reviewed and listed as per patient’s electronic medical chart  Vitals:    04/11/17 1018   BP: 132/70   Pulse: 61   Resp: 20   Temp: 97.2 °F (36.2 °C)   SpO2: 99%     General: Awake, alert and oriented, in no distress  HEENT: Head is atraumatic, normocephalic, extraocular  movements full, oropharynx clear, no scleral icterus, pink moist mucous membranes  Neck: supple, no jvd, lymphadenopathy or masses  Cardiovascular: regular rate and rhythm without murmurs, rubs or gallops  Pulmonary: non-labored, clear to auscultation bilaterally, no wheezing  Abdomen: soft, non-tender, non-distended, normal active bowel sounds present, no organomegaly  Extremities: No clubbing, cyanosis or edema, kyphosis  Lymph: No cervical, supraclavicular adenopathy  Neurologic: Mental status as above, alert, awake and oriented, grossly non-focal exam  Skin: warm, dry, intact        Labs / Studies:    Consult on 03/13/2017   Component Date Value   • Performed by: 03/13/2017 Katiana Werner    • Pathologist Interpretati* 03/13/2017 See scanned report    • Vitamin B-12 03/13/2017 553    • C-Reactive Protein 03/13/2017 <0.50    • Sed Rate 03/13/2017 7    • Folate 03/13/2017 >24.00*   • CHLOE Direct 03/13/2017 Negative    • Platelets 03/13/2017 88*   • Platelets 03/13/2017 188    • WBC 03/13/2017 4.32*   • RBC 03/13/2017 4.22*   • Hemoglobin 03/13/2017 13.7*   • Hematocrit 03/13/2017 41.1*   • MCV 03/13/2017 97.4*   • MCH 03/13/2017 32.5    • MCHC 03/13/2017 33.3    • RDW 03/13/2017 12.6    • RDW-SD 03/13/2017 44.3    • MPV 03/13/2017 10.3*   • Platelets 03/13/2017 206    • Neutrophil % 03/13/2017 57.9    • Lymphocyte % 03/13/2017 32.2    • Monocyte % 03/13/2017 8.1    • Eosinophil % 03/13/2017 1.6    • Basophil % 03/13/2017 0.2    • Immature Grans % 03/13/2017 0.0    • Neutrophils, Absolute 03/13/2017 2.50    • Lymphocytes, Absolute 03/13/2017 1.39    • Monocytes, Absolute 03/13/2017 0.35    • Eosinophils, Absolute 03/13/2017 0.07    • Basophils, Absolute 03/13/2017 0.01    • Immature Grans, Absolute 03/13/2017 0.00    Lab on 01/20/2017   Component Date Value   • Glucose 01/20/2017 83    • BUN 01/20/2017 20    • Creatinine 01/20/2017 0.92    • Sodium 01/20/2017 148    • Potassium 01/20/2017 4.1    • Chloride  01/20/2017 107    • CO2 01/20/2017 28.8    • Calcium 01/20/2017 9.6    • Total Protein 01/20/2017 7.3    • Albumin 01/20/2017 4.70    • ALT (SGPT) 01/20/2017 32    • AST (SGOT) 01/20/2017 36*   • Alkaline Phosphatase 01/20/2017 44*   • Total Bilirubin 01/20/2017 0.8    • eGFR Non  Amer 01/20/2017 83    • Globulin 01/20/2017 2.6    • A/G Ratio 01/20/2017 1.8    • BUN/Creatinine Ratio 01/20/2017 21.7    • Anion Gap 01/20/2017 12.2*   • Total Cholesterol 01/20/2017 151    • Triglycerides 01/20/2017 57    • HDL Cholesterol 01/20/2017 69    • LDL Cholesterol  01/20/2017 71    • VLDL Cholesterol 01/20/2017 11.4    • LDL/HDL Ratio 01/20/2017 1.02    • PSA 01/20/2017 0.770    • TSH 01/20/2017 1.517    • WBC 01/20/2017 5.03    • RBC 01/20/2017 4.38*   • Hemoglobin 01/20/2017 13.9*   • Hematocrit 01/20/2017 42.5    • MCV 01/20/2017 97.0*   • MCH 01/20/2017 31.7    • MCHC 01/20/2017 32.7*   • RDW 01/20/2017 12.8    • RDW-SD 01/20/2017 45.1    • MPV 01/20/2017 11.7*   • Platelets 01/20/2017 89*   • Neutrophil % 01/20/2017 58.3    • Lymphocyte % 01/20/2017 29.8    • Monocyte % 01/20/2017 8.3    • Eosinophil % 01/20/2017 3.2    • Basophil % 01/20/2017 0.2    • Immature Grans % 01/20/2017 0.2    • Neutrophils, Absolute 01/20/2017 2.93    • Lymphocytes, Absolute 01/20/2017 1.50    • Monocytes, Absolute 01/20/2017 0.42    • Eosinophils, Absolute 01/20/2017 0.16    • Basophils, Absolute 01/20/2017 0.01    • Immature Grans, Absolute 01/20/2017 0.01    • Osmolality Calc 01/20/2017 296.0           PATHOLOGY:  03/15/17: Peripheral Smear:            Assessment/Plan   Jem Crowder is a very pleasant 66 y.o.  male who presents in new consultation at the request of Marek Khanna, for further management of thrombocytopenia.     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 His platelet count today is also normal. I did also obtain a peripheral smear for further evaluation which showed mild leukopenia with normal platelet count. I did also assess for nutritional deficiencies that may be contributing such as B12 and folate which were normal. CHLOE was normal. I did also assess for underlying inflammation which may be suppressing bone marrow which were also normal. Given that thrombocytopenia appears to be resolved will continue to monitor in 3 months.    Leuokopenia  This appears to have resolved along with thrombocytopenia.    Leg Cramps  I did check a BMP and magnesium level which were normal.    I will have the patient return in follow up appointment in three months. He understands that should he have any questions or concerns prior to his appointment he should give us a call at any time and I would be happy to see him sooner. It was a pleasure to see this patient in clinic today, thank you for allowing me to participate in the care of this patient.    I spent 25 minutes in regards to this patient’s care today. More than 19 minutes of the time was spent in direct interaction with the patient for the above problems.        Pat Hooks MD  03/13/2017  10:48 AM

## 2017-07-11 ENCOUNTER — OFFICE VISIT (OUTPATIENT)
Dept: ONCOLOGY | Facility: CLINIC | Age: 67
End: 2017-07-11

## 2017-07-11 VITALS
SYSTOLIC BLOOD PRESSURE: 131 MMHG | DIASTOLIC BLOOD PRESSURE: 84 MMHG | BODY MASS INDEX: 23.89 KG/M2 | TEMPERATURE: 98.2 F | RESPIRATION RATE: 18 BRPM | HEART RATE: 62 BPM | WEIGHT: 166.5 LBS | OXYGEN SATURATION: 100 %

## 2017-07-11 DIAGNOSIS — E78.5 HYPERLIPIDEMIA, UNSPECIFIED HYPERLIPIDEMIA TYPE: ICD-10-CM

## 2017-07-11 DIAGNOSIS — D72.819 LEUKOPENIA, UNSPECIFIED TYPE: ICD-10-CM

## 2017-07-11 DIAGNOSIS — D69.6 THROMBOCYTOPENIA (HCC): Primary | ICD-10-CM

## 2017-07-11 LAB
ALBUMIN SERPL-MCNC: 4.8 G/DL (ref 3.4–4.8)
ALBUMIN/GLOB SERPL: 1.9 G/DL (ref 1.5–2.5)
ALP SERPL-CCNC: 44 U/L (ref 40–129)
ALT SERPL W P-5'-P-CCNC: 25 U/L (ref 10–44)
ANION GAP SERPL CALCULATED.3IONS-SCNC: 1.5 MMOL/L (ref 3.6–11.2)
AST SERPL-CCNC: 29 U/L (ref 10–34)
BASOPHILS # BLD AUTO: 0.01 10*3/MM3 (ref 0–0.3)
BASOPHILS # BLD AUTO: 0.01 10*3/MM3 (ref 0–0.3)
BASOPHILS NFR BLD AUTO: 0.2 % (ref 0–2)
BASOPHILS NFR BLD AUTO: 0.2 % (ref 0–2)
BILIRUB SERPL-MCNC: 0.7 MG/DL (ref 0.2–1.8)
BUN BLD-MCNC: 14 MG/DL (ref 7–21)
BUN/CREAT SERPL: 15.4 (ref 7–25)
CALCIUM SPEC-SCNC: 9.8 MG/DL (ref 7.7–10)
CHLORIDE SERPL-SCNC: 106 MMOL/L (ref 99–112)
CHOLEST SERPL-MCNC: 161 MG/DL (ref 0–200)
CO2 SERPL-SCNC: 34.5 MMOL/L (ref 24.3–31.9)
CREAT BLD-MCNC: 0.91 MG/DL (ref 0.43–1.29)
DEPRECATED RDW RBC AUTO: 43.5 FL (ref 37–54)
DEPRECATED RDW RBC AUTO: 44.1 FL (ref 37–54)
EOSINOPHIL # BLD AUTO: 0.11 10*3/MM3 (ref 0–0.7)
EOSINOPHIL # BLD AUTO: 0.14 10*3/MM3 (ref 0–0.7)
EOSINOPHIL NFR BLD AUTO: 2.4 % (ref 0–7)
EOSINOPHIL NFR BLD AUTO: 3 % (ref 0–7)
ERYTHROCYTE [DISTWIDTH] IN BLOOD BY AUTOMATED COUNT: 12.5 % (ref 11.5–14.5)
ERYTHROCYTE [DISTWIDTH] IN BLOOD BY AUTOMATED COUNT: 12.6 % (ref 11.5–14.5)
GFR SERPL CREATININE-BSD FRML MDRD: 83 ML/MIN/1.73
GLOBULIN UR ELPH-MCNC: 2.5 GM/DL
GLUCOSE BLD-MCNC: 89 MG/DL (ref 70–110)
HCT VFR BLD AUTO: 40.7 % (ref 42–52)
HCT VFR BLD AUTO: 41.6 % (ref 42–52)
HDLC SERPL-MCNC: 70 MG/DL (ref 60–100)
HGB BLD-MCNC: 13.7 G/DL (ref 14–18)
HGB BLD-MCNC: 13.7 G/DL (ref 14–18)
IMM GRANULOCYTES # BLD: 0.01 10*3/MM3 (ref 0–0.03)
IMM GRANULOCYTES # BLD: 0.01 10*3/MM3 (ref 0–0.03)
IMM GRANULOCYTES NFR BLD: 0.2 % (ref 0–0.5)
IMM GRANULOCYTES NFR BLD: 0.2 % (ref 0–0.5)
LDLC SERPL CALC-MCNC: 77 MG/DL (ref 0–100)
LDLC/HDLC SERPL: 1.09 {RATIO}
LYMPHOCYTES # BLD AUTO: 1.42 10*3/MM3 (ref 1–3)
LYMPHOCYTES # BLD AUTO: 1.51 10*3/MM3 (ref 1–3)
LYMPHOCYTES NFR BLD AUTO: 31.4 % (ref 16–46)
LYMPHOCYTES NFR BLD AUTO: 32.5 % (ref 16–46)
MCH RBC QN AUTO: 31.6 PG (ref 27–33)
MCH RBC QN AUTO: 32.3 PG (ref 27–33)
MCHC RBC AUTO-ENTMCNC: 32.9 G/DL (ref 33–37)
MCHC RBC AUTO-ENTMCNC: 33.7 G/DL (ref 33–37)
MCV RBC AUTO: 95.9 FL (ref 80–94)
MCV RBC AUTO: 96 FL (ref 80–94)
MONOCYTES # BLD AUTO: 0.29 10*3/MM3 (ref 0.1–0.9)
MONOCYTES # BLD AUTO: 0.37 10*3/MM3 (ref 0.1–0.9)
MONOCYTES NFR BLD AUTO: 6.3 % (ref 0–12)
MONOCYTES NFR BLD AUTO: 8.2 % (ref 0–12)
NEUTROPHILS # BLD AUTO: 2.6 10*3/MM3 (ref 1.4–6.5)
NEUTROPHILS # BLD AUTO: 2.68 10*3/MM3 (ref 1.4–6.5)
NEUTROPHILS NFR BLD AUTO: 57.6 % (ref 40–75)
NEUTROPHILS NFR BLD AUTO: 57.8 % (ref 40–75)
OSMOLALITY SERPL CALC.SUM OF ELEC: 283.1 MOSM/KG (ref 273–305)
PLATELET # BLD AUTO: 161 10*3/MM3 (ref 130–400)
PLATELET # BLD AUTO: 166 10*3/MM3 (ref 130–400)
PMV BLD AUTO: 10.8 FL (ref 6–10)
PMV BLD AUTO: 11.2 FL (ref 6–10)
POTASSIUM BLD-SCNC: 4.4 MMOL/L (ref 3.5–5.3)
PROT SERPL-MCNC: 7.3 G/DL (ref 6–8)
RBC # BLD AUTO: 4.24 10*6/MM3 (ref 4.7–6.1)
RBC # BLD AUTO: 4.34 10*6/MM3 (ref 4.7–6.1)
SODIUM BLD-SCNC: 142 MMOL/L (ref 135–153)
TRIGL SERPL-MCNC: 72 MG/DL (ref 0–150)
VLDLC SERPL-MCNC: 14.4 MG/DL
WBC NRBC COR # BLD: 4.52 10*3/MM3 (ref 4.5–12.5)
WBC NRBC COR # BLD: 4.64 10*3/MM3 (ref 4.5–12.5)

## 2017-07-11 PROCEDURE — 85025 COMPLETE CBC W/AUTO DIFF WBC: CPT | Performed by: FAMILY MEDICINE

## 2017-07-11 PROCEDURE — 80053 COMPREHEN METABOLIC PANEL: CPT | Performed by: FAMILY MEDICINE

## 2017-07-11 PROCEDURE — 80061 LIPID PANEL: CPT | Performed by: FAMILY MEDICINE

## 2017-07-11 PROCEDURE — 85025 COMPLETE CBC W/AUTO DIFF WBC: CPT | Performed by: INTERNAL MEDICINE

## 2017-07-11 PROCEDURE — 99214 OFFICE O/P EST MOD 30 MIN: CPT | Performed by: INTERNAL MEDICINE

## 2017-07-11 NOTE — PROGRESS NOTES
Jem Crodwer  [id number]  1950 7/11/2017      Referring Provider:   Marek Khanna MD    Reason for Follow up:   Thrombocytopenia     Chief Complaint:  Leg Cramps      History of Present Illness:  Jem Crowder is a very pleasant 67 y.o.  male who presents in new consultation at the request of Dr. Khanna for further management of thrombocytopenia.     Mr. Crowder reports that he has had thrombocytopenia for some time and was in fact evaluated by hematologist Dr. Mayfield in 2014. At that time he was on daily doxycycline for acne rosacea and it was felt that his thrombocytopenia could be monitored. He states that he was previously on Lipitor for elevated cholesterol and was later changed to simvastatin due to insurance issues. He believes that his thrombocytopenia began when he started simvastatin. He does take a daily multivitamin otherwise he denies of any NSAID with exception of 81mg of Aspirin daily. He also denies of any other vitamin or herbal medication use. He does report easy bruising however he is on Asprin 81 mg daily and denies of any abnormal or spontaneous bleeding. He denies of any history of blood transfusion. He denies of any fevers, weight loss, night sweats or lymphadenopathy. He reports feeling well otherwise. In review of his complete blood counts in our system it appears that this has been occurring since 1/2015 and in fact normalized in 8/2015 and again began occurring in 1/2016. The platelet counts during that period have ranged from 77 to 199 thousand.     Upon further evaluation in my clinic his platelet count has normalized and can be low when placed in various tubes. 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 Since his last visit with me he denies of any significant changes. He denies of any bleeding an his main complaint today appears to be related to his ongoing leg cramps.    The following portions of the patient's history were reviewed and  updated as appropriate: allergies, current medications, past family history, past medical history, past social history, past surgical history and problem list.    Allergies   Allergen Reactions   • Penicillins        Past Medical History:   Diagnosis Date   • Coronary artery disease    • Diverticulitis    • Rosacea        Past Surgical History:   Procedure Laterality Date   • COLONOSCOPY  10/01/2015       Social History     Social History   • Marital status:      Spouse name: N/A   • Number of children: N/A   • Years of education: N/A     Occupational History   • Not on file.     Social History Main Topics   • Smoking status: Never Smoker   • Smokeless tobacco: Former User     Types: Chew     Quit date: 7/21/2000   • Alcohol use No   • Drug use: No   • Sexual activity: Not on file     Other Topics Concern   • Not on file     Social History Narrative   He currently lives by himself he does have one son who lives in [place]. He is currently retired.    Family History   Problem Relation Age of Onset   • Heart disease Mother    • Hypertension Mother    • Lung disease Father      Black Lung   • Stomach cancer Father    • Cancer Brother          Current Outpatient Prescriptions:   •  aspirin 81 MG tablet, Take 81 mg by mouth daily., Disp: , Rfl:   •  diphenhydrAMINE (BENADRYL) 25 MG tablet, Take 50 mg by mouth at night as needed for itching or sleep., Disp: , Rfl:   •  docusate sodium (COLACE) 250 MG capsule, Take 250 mg by mouth Daily., Disp: , Rfl:   •  metroNIDAZOLE (METROGEL) 0.75 % gel, Apply  topically 2 (Two) Times a Day., Disp: 45 g, Rfl: 2  •  Multiple Vitamin (MULTI VITAMIN MENS) tablet, Take 1 tablet by mouth daily., Disp: , Rfl:   •  simvastatin (ZOCOR) 20 MG tablet, Take 1 tablet by mouth Every Night., Disp: 90 tablet, Rfl: 3        Review of Systems  A comprehensive 14 point review of systems was conducted with patient and positive as per HPI otherwise negative. +occasional seasonal allergies and  intermittent sinus drainage      Physical Exam  Vital Signs: These were reviewed and listed as per patient’s electronic medical chart  Vitals:    07/11/17 1011   BP: 131/84   Pulse: 62   Resp: 18   Temp: 98.2 °F (36.8 °C)   SpO2: 100%     General: Awake, alert and oriented, in no distress  HEENT: Head is atraumatic, normocephalic, extraocular movements full, oropharynx clear, no scleral icterus, pink moist mucous membranes  Neck: supple, no jvd, lymphadenopathy or masses  Cardiovascular: regular rate and rhythm without murmurs, rubs or gallops  Pulmonary: non-labored, clear to auscultation bilaterally, no wheezing  Abdomen: soft, non-tender, non-distended, normal active bowel sounds present, no organomegaly  Extremities: No clubbing, cyanosis or edema, kyphosis  Lymph: No cervical, supraclavicular adenopathy  Neurologic: Mental status as above, alert, awake and oriented, grossly non-focal exam  Skin: warm, dry, intact        Labs / Studies:    Office Visit on 07/11/2017   Component Date Value   • Total Cholesterol 07/11/2017 161    • Triglycerides 07/11/2017 72    • HDL Cholesterol 07/11/2017 70    • LDL Cholesterol  07/11/2017 77    • VLDL Cholesterol 07/11/2017 14.4    • LDL/HDL Ratio 07/11/2017 1.09    • Glucose 07/11/2017 89    • BUN 07/11/2017 14    • Creatinine 07/11/2017 0.91    • Sodium 07/11/2017 142    • Potassium 07/11/2017 4.4    • Chloride 07/11/2017 106    • CO2 07/11/2017 34.5*   • Calcium 07/11/2017 9.8    • Total Protein 07/11/2017 7.3    • Albumin 07/11/2017 4.80    • ALT (SGPT) 07/11/2017 25    • AST (SGOT) 07/11/2017 29    • Alkaline Phosphatase 07/11/2017 44    • Total Bilirubin 07/11/2017 0.7    • eGFR Non African Amer 07/11/2017 83    • Globulin 07/11/2017 2.5    • A/G Ratio 07/11/2017 1.9    • BUN/Creatinine Ratio 07/11/2017 15.4    • Anion Gap 07/11/2017 1.5*   • WBC 07/11/2017 4.64    • RBC 07/11/2017 4.24*   • Hemoglobin 07/11/2017 13.7*   • Hematocrit 07/11/2017 40.7*   • MCV 07/11/2017  96.0*   • MCH 07/11/2017 32.3    • MCHC 07/11/2017 33.7    • RDW 07/11/2017 12.6    • RDW-SD 07/11/2017 43.5    • MPV 07/11/2017 10.8*   • Platelets 07/11/2017 166    • Neutrophil % 07/11/2017 57.8    • Lymphocyte % 07/11/2017 32.5    • Monocyte % 07/11/2017 6.3    • Eosinophil % 07/11/2017 3.0    • Basophil % 07/11/2017 0.2    • Immature Grans % 07/11/2017 0.2    • Neutrophils, Absolute 07/11/2017 2.68    • Lymphocytes, Absolute 07/11/2017 1.51    • Monocytes, Absolute 07/11/2017 0.29    • Eosinophils, Absolute 07/11/2017 0.14    • Basophils, Absolute 07/11/2017 0.01    • Immature Grans, Absolute 07/11/2017 0.01    • WBC 07/11/2017 4.52    • RBC 07/11/2017 4.34*   • Hemoglobin 07/11/2017 13.7*   • Hematocrit 07/11/2017 41.6*   • MCV 07/11/2017 95.9*   • MCH 07/11/2017 31.6    • MCHC 07/11/2017 32.9*   • RDW 07/11/2017 12.5    • RDW-SD 07/11/2017 44.1    • MPV 07/11/2017 11.2*   • Platelets 07/11/2017 161    • Neutrophil % 07/11/2017 57.6    • Lymphocyte % 07/11/2017 31.4    • Monocyte % 07/11/2017 8.2    • Eosinophil % 07/11/2017 2.4    • Basophil % 07/11/2017 0.2    • Immature Grans % 07/11/2017 0.2    • Neutrophils, Absolute 07/11/2017 2.60    • Lymphocytes, Absolute 07/11/2017 1.42    • Monocytes, Absolute 07/11/2017 0.37    • Eosinophils, Absolute 07/11/2017 0.11    • Basophils, Absolute 07/11/2017 0.01    • Immature Grans, Absolute 07/11/2017 0.01    • Osmolality Calc 07/11/2017 283.1    Office Visit on 04/11/2017   Component Date Value   • Glucose 04/11/2017 96    • BUN 04/11/2017 20    • Creatinine 04/11/2017 0.87    • Sodium 04/11/2017 141    • Potassium 04/11/2017 4.0    • Chloride 04/11/2017 106    • CO2 04/11/2017 31.7    • Calcium 04/11/2017 9.6    • eGFR Non African Amer 04/11/2017 88    • BUN/Creatinine Ratio 04/11/2017 23.0    • Anion Gap 04/11/2017 3.3*   • Magnesium 04/11/2017 2.1    • WBC 04/11/2017 4.62    • RBC 04/11/2017 4.34*   • Hemoglobin 04/11/2017 13.9*   • Hematocrit 04/11/2017 42.1    •  MCV 04/11/2017 97.0*   • MCH 04/11/2017 32.0    • MCHC 04/11/2017 33.0    • RDW 04/11/2017 12.8    • RDW-SD 04/11/2017 45.3    • MPV 04/11/2017 10.2*   • Platelets 04/11/2017 212    • Neutrophil % 04/11/2017 57.6    • Lymphocyte % 04/11/2017 32.5    • Monocyte % 04/11/2017 7.1    • Eosinophil % 04/11/2017 2.4    • Basophil % 04/11/2017 0.2    • Immature Grans % 04/11/2017 0.2    • Neutrophils, Absolute 04/11/2017 2.66    • Lymphocytes, Absolute 04/11/2017 1.50    • Monocytes, Absolute 04/11/2017 0.33    • Eosinophils, Absolute 04/11/2017 0.11    • Basophils, Absolute 04/11/2017 0.01    • Immature Grans, Absolute 04/11/2017 0.01    • Osmolality Calc 04/11/2017 283.7    Consult on 03/13/2017   Component Date Value   • Performed by: 03/13/2017 Katiana Werner    • Pathologist Interpretati* 03/13/2017 See scanned report    • Vitamin B-12 03/13/2017 553    • C-Reactive Protein 03/13/2017 <0.50    • Sed Rate 03/13/2017 7    • Folate 03/13/2017 >24.00*   • CHLOE Direct 03/13/2017 Negative    • Platelets 03/13/2017 88*   • Platelets 03/13/2017 188    • WBC 03/13/2017 4.32*   • RBC 03/13/2017 4.22*   • Hemoglobin 03/13/2017 13.7*   • Hematocrit 03/13/2017 41.1*   • MCV 03/13/2017 97.4*   • MCH 03/13/2017 32.5    • MCHC 03/13/2017 33.3    • RDW 03/13/2017 12.6    • RDW-SD 03/13/2017 44.3    • MPV 03/13/2017 10.3*   • Platelets 03/13/2017 206    • Neutrophil % 03/13/2017 57.9    • Lymphocyte % 03/13/2017 32.2    • Monocyte % 03/13/2017 8.1    • Eosinophil % 03/13/2017 1.6    • Basophil % 03/13/2017 0.2    • Immature Grans % 03/13/2017 0.0    • Neutrophils, Absolute 03/13/2017 2.50    • Lymphocytes, Absolute 03/13/2017 1.39    • Monocytes, Absolute 03/13/2017 0.35    • Eosinophils, Absolute 03/13/2017 0.07    • Basophils, Absolute 03/13/2017 0.01    • Immature Grans, Absolute 03/13/2017 0.00    Lab on 01/20/2017   Component Date Value   • Glucose 01/20/2017 83    • BUN 01/20/2017 20    • Creatinine 01/20/2017 0.92    • Sodium  01/20/2017 148    • Potassium 01/20/2017 4.1    • Chloride 01/20/2017 107    • CO2 01/20/2017 28.8    • Calcium 01/20/2017 9.6    • Total Protein 01/20/2017 7.3    • Albumin 01/20/2017 4.70    • ALT (SGPT) 01/20/2017 32    • AST (SGOT) 01/20/2017 36*   • Alkaline Phosphatase 01/20/2017 44*   • Total Bilirubin 01/20/2017 0.8    • eGFR Non  Amer 01/20/2017 83    • Globulin 01/20/2017 2.6    • A/G Ratio 01/20/2017 1.8    • BUN/Creatinine Ratio 01/20/2017 21.7    • Anion Gap 01/20/2017 12.2*   • Total Cholesterol 01/20/2017 151    • Triglycerides 01/20/2017 57    • HDL Cholesterol 01/20/2017 69    • LDL Cholesterol  01/20/2017 71    • VLDL Cholesterol 01/20/2017 11.4    • LDL/HDL Ratio 01/20/2017 1.02    • PSA 01/20/2017 0.770    • TSH 01/20/2017 1.517    • WBC 01/20/2017 5.03    • RBC 01/20/2017 4.38*   • Hemoglobin 01/20/2017 13.9*   • Hematocrit 01/20/2017 42.5    • MCV 01/20/2017 97.0*   • MCH 01/20/2017 31.7    • MCHC 01/20/2017 32.7*   • RDW 01/20/2017 12.8    • RDW-SD 01/20/2017 45.1    • MPV 01/20/2017 11.7*   • Platelets 01/20/2017 89*   • Neutrophil % 01/20/2017 58.3    • Lymphocyte % 01/20/2017 29.8    • Monocyte % 01/20/2017 8.3    • Eosinophil % 01/20/2017 3.2    • Basophil % 01/20/2017 0.2    • Immature Grans % 01/20/2017 0.2    • Neutrophils, Absolute 01/20/2017 2.93    • Lymphocytes, Absolute 01/20/2017 1.50    • Monocytes, Absolute 01/20/2017 0.42    • Eosinophils, Absolute 01/20/2017 0.16    • Basophils, Absolute 01/20/2017 0.01    • Immature Grans, Absolute 01/20/2017 0.01    • Osmolality Calc 01/20/2017 296.0           PATHOLOGY:  03/15/17: Peripheral Smear:            Assessment/Plan   Jem Crowder is a very pleasant 67 y.o.  male who presents in new consultation at the request of Dr. Khanna for further management of thrombocytopenia.     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 His platelet count today is also normal. I did also obtain a peripheral smear for further evaluation which showed mild leukopenia with normal platelet count and his total white blood cell count is within normal limits on CBC. I did also assess for nutritional deficiencies that may be contributing such as B12 and folate which were normal. CHLOE was normal. I did also assess for underlying inflammation which may be suppressing bone marrow which were also normal. Given that thrombocytopenia appears to be resolved will continue to monitor in 4 months or on a as needed basis if this continues to remain normal with his primary provider.    Leukopenia  This appears to have resolved along with thrombocytopenia, although low normal which may be normal for the patient.    Leg Cramps  I did check a BMP and magnesium level which were normal.    I will have the patient return in follow up appointment in four months or on a as needed basis if his counts remain normalized upon visits with his primary provider. He understands that should he have any questions or concerns prior to his appointment he should give us a call at any time and I would be happy to see him sooner. It was a pleasure to see this patient in clinic today, thank you for allowing me to participate in the care of this patient.    I spent 26 minutes in regards to this patient’s care today. More than 20 minutes of the time was spent in direct interaction with the patient for the above problems.        Pat Hooks MD  07/11/2017  6:00 PM

## 2017-08-02 ENCOUNTER — OFFICE VISIT (OUTPATIENT)
Dept: FAMILY MEDICINE CLINIC | Facility: CLINIC | Age: 67
End: 2017-08-02

## 2017-08-02 VITALS
HEIGHT: 70 IN | SYSTOLIC BLOOD PRESSURE: 122 MMHG | BODY MASS INDEX: 24.07 KG/M2 | HEART RATE: 60 BPM | DIASTOLIC BLOOD PRESSURE: 79 MMHG | TEMPERATURE: 97.3 F | WEIGHT: 168.1 LBS

## 2017-08-02 DIAGNOSIS — E78.2 MIXED HYPERLIPIDEMIA: Primary | ICD-10-CM

## 2017-08-02 DIAGNOSIS — L71.9 ROSACEA: ICD-10-CM

## 2017-08-02 DIAGNOSIS — Z12.5 SCREENING FOR PROSTATE CANCER: ICD-10-CM

## 2017-08-02 DIAGNOSIS — Z11.59 NEED FOR HEPATITIS C SCREENING TEST: ICD-10-CM

## 2017-08-02 PROCEDURE — 99213 OFFICE O/P EST LOW 20 MIN: CPT | Performed by: FAMILY MEDICINE

## 2017-08-02 PROCEDURE — 90732 PPSV23 VACC 2 YRS+ SUBQ/IM: CPT | Performed by: FAMILY MEDICINE

## 2017-08-02 PROCEDURE — G0009 ADMIN PNEUMOCOCCAL VACCINE: HCPCS | Performed by: FAMILY MEDICINE

## 2017-08-02 NOTE — PROGRESS NOTES
"Subjective   Jem Crowder is a 67 y.o. male.     History of Present Illness follow-up regarding dyslipidemia recent metabolic monitoring.  Generally having no new problems.  Did visit with hematology.  Had extensive evaluation.  Records reviewed.  CBC has remained stable.  Denies bleeding bruising.  Denies SOB CP palpitations GI  skin changes orthopedic concerns.  Trying to stay very active physically.  Compliant with dietary recommendations.  Essentially current on PME.    The following portions of the patient's history were reviewed and updated as appropriate: allergies, current medications, past medical history, past social history, past surgical history and problem list.    Review of Systems see the history of present illness    Objective   Physical Exam   Constitutional: He is oriented to person, place, and time. He appears well-developed and well-nourished.   HENT:   Head: Normocephalic.   Mouth/Throat: Oropharynx is clear and moist.   Eyes: Conjunctivae and EOM are normal. Pupils are equal, round, and reactive to light.   Neck: Normal range of motion. Neck supple. No tracheal deviation present. No thyromegaly present.   Cardiovascular: Normal rate, regular rhythm, normal heart sounds and intact distal pulses.    No murmur heard.  Pulmonary/Chest: Effort normal and breath sounds normal.   Musculoskeletal: He exhibits no edema.   Lymphadenopathy:     He has no cervical adenopathy.   Neurological: He is alert and oriented to person, place, and time.   Skin: Skin is warm and dry.   Psychiatric: He has a normal mood and affect.   Vitals reviewed.    /79 (BP Location: Left arm, Patient Position: Sitting)  Pulse 60  Temp 97.3 °F (36.3 °C) (Oral)   Ht 70\" (177.8 cm)  Wt 168 lb 1.6 oz (76.2 kg)  BMI 24.12 kg/m2  Assessment/Plan   Jem was seen today for hyperlipidemia.    Diagnoses and all orders for this visit:    Mixed hyperlipidemia  -     Comprehensive Metabolic Panel; Future  -     Lipid Panel; " Future    Rosacea    Need for hepatitis C screening test  -     Hepatitis C Antibody; Future    Screening for prostate cancer  -     PSA Screen; Future    Other orders  -     Pneumococcal Polysaccharide Vaccine 23-Valent Greater Than or Equal To 1yo Subcutaneous / IM    All looks good.  Reviewed recent metabolic and hematologic parameters.  Platelet count normal.  Lipid parameters really good results.  Today after consent pneumococcal 23 vaccine given.  Will continue all medications as reconciled ordered.  Stay safely active maintain aggressive TLC.  Recheck in 6 months to monitor status.  Will check labs at that time also.  Flu vaccine sooner.  Skin looks good.

## 2017-11-07 ENCOUNTER — OFFICE VISIT (OUTPATIENT)
Dept: ONCOLOGY | Facility: CLINIC | Age: 67
End: 2017-11-07

## 2017-11-07 VITALS
WEIGHT: 163.3 LBS | OXYGEN SATURATION: 99 % | TEMPERATURE: 97.2 F | HEART RATE: 72 BPM | DIASTOLIC BLOOD PRESSURE: 77 MMHG | BODY MASS INDEX: 23.43 KG/M2 | SYSTOLIC BLOOD PRESSURE: 121 MMHG | RESPIRATION RATE: 16 BRPM

## 2017-11-07 DIAGNOSIS — D69.6 THROMBOCYTOPENIA (HCC): Primary | ICD-10-CM

## 2017-11-07 DIAGNOSIS — Z23 IMMUNIZATION DUE: ICD-10-CM

## 2017-11-07 DIAGNOSIS — R25.2 CRAMP OF BOTH LOWER EXTREMITIES: ICD-10-CM

## 2017-11-07 DIAGNOSIS — Z00.00 HEALTHCARE MAINTENANCE: ICD-10-CM

## 2017-11-07 DIAGNOSIS — D72.819 LEUKOPENIA, UNSPECIFIED TYPE: ICD-10-CM

## 2017-11-07 LAB
BASOPHILS # BLD AUTO: 0 10*3/MM3 (ref 0–0.3)
BASOPHILS NFR BLD AUTO: 0 % (ref 0–2)
DEPRECATED RDW RBC AUTO: 44.1 FL (ref 37–54)
EOSINOPHIL # BLD AUTO: 0.07 10*3/MM3 (ref 0–0.7)
EOSINOPHIL NFR BLD AUTO: 1.6 % (ref 0–7)
ERYTHROCYTE [DISTWIDTH] IN BLOOD BY AUTOMATED COUNT: 12.6 % (ref 11.5–14.5)
HCT VFR BLD AUTO: 41.1 % (ref 42–52)
HGB BLD-MCNC: 13.8 G/DL (ref 14–18)
IMM GRANULOCYTES # BLD: 0 10*3/MM3 (ref 0–0.03)
IMM GRANULOCYTES NFR BLD: 0 % (ref 0–0.5)
LYMPHOCYTES # BLD AUTO: 1.49 10*3/MM3 (ref 1–3)
LYMPHOCYTES NFR BLD AUTO: 33.6 % (ref 16–46)
MCH RBC QN AUTO: 32 PG (ref 27–33)
MCHC RBC AUTO-ENTMCNC: 33.6 G/DL (ref 33–37)
MCV RBC AUTO: 95.4 FL (ref 80–94)
MONOCYTES # BLD AUTO: 0.39 10*3/MM3 (ref 0.1–0.9)
MONOCYTES NFR BLD AUTO: 8.8 % (ref 0–12)
NEUTROPHILS # BLD AUTO: 2.48 10*3/MM3 (ref 1.4–6.5)
NEUTROPHILS NFR BLD AUTO: 56 % (ref 40–75)
PLATELET # BLD AUTO: 175 10*3/MM3 (ref 130–400)
PMV BLD AUTO: 10.8 FL (ref 6–10)
RBC # BLD AUTO: 4.31 10*6/MM3 (ref 4.7–6.1)
WBC NRBC COR # BLD: 4.43 10*3/MM3 (ref 4.5–12.5)

## 2017-11-07 PROCEDURE — 90662 IIV NO PRSV INCREASED AG IM: CPT | Performed by: INTERNAL MEDICINE

## 2017-11-07 PROCEDURE — 85025 COMPLETE CBC W/AUTO DIFF WBC: CPT | Performed by: INTERNAL MEDICINE

## 2017-11-07 PROCEDURE — 96372 THER/PROPH/DIAG INJ SC/IM: CPT

## 2017-11-07 PROCEDURE — 99214 OFFICE O/P EST MOD 30 MIN: CPT | Performed by: INTERNAL MEDICINE

## 2017-11-07 PROCEDURE — G0008 ADMIN INFLUENZA VIRUS VAC: HCPCS | Performed by: INTERNAL MEDICINE

## 2017-11-07 NOTE — PROGRESS NOTES
Jem Crowder  [id number]  1950 11/7/2017      Referring Provider:   Marek Khanna MD    Reason for Follow up:   Thrombocytopenia   Leukopenia    Chief Complaint:  Leg Cramps      History of Present Illness:  Jem Crowder is a very pleasant 67 y.o.  male who presents in follow up appointment at the request of Dr. Khanna for further management of thrombocytopenia and leukopenia.     Mr. Crowder reports that he has had thrombocytopenia for some time and was in fact evaluated by hematologist Dr. Mayfield in 2014. At that time he was on daily doxycycline for acne rosacea and it was felt that his thrombocytopenia could be monitored. He states that he was previously on Lipitor for elevated cholesterol and was later changed to simvastatin due to insurance issues. He believes that his thrombocytopenia began when he started simvastatin. He does take a daily multivitamin otherwise he denies of any NSAID with exception of 81mg of Aspirin daily. He also denies of any other vitamin or herbal medication use. He does report easy bruising however he is on Asprin 81 mg daily and denies of any abnormal or spontaneous bleeding. He denies of any history of blood transfusion. He denies of any fevers, weight loss, night sweats or lymphadenopathy. He reports feeling well otherwise. In review of his complete blood counts in our system it appears that this has been occurring since 1/2015 and in fact normalized in 8/2015 and again began occurring in 1/2016. The platelet counts during that period have ranged from 77 to 199 thousand.     Upon further evaluation in my clinic his platelet count has normalized and can be low when placed in various tubes. Therefore he likely has a component of pseudothrombocytopenia. He denies of any significant NSAID use. Since his last visit with me he denies of any significant changes. He denies of any bleeding or significant complaints today although notes that he does have ongoing leg  cramps that has been long standing.    The following portions of the patient's history were reviewed and updated as appropriate: allergies, current medications, past family history, past medical history, past social history, past surgical history and problem list.    Allergies   Allergen Reactions   • Penicillins        Past Medical History:   Diagnosis Date   • Coronary artery disease    • Diverticulitis    • Rosacea        Past Surgical History:   Procedure Laterality Date   • COLONOSCOPY  10/01/2015       Social History     Social History   • Marital status:      Spouse name: N/A   • Number of children: N/A   • Years of education: N/A     Occupational History   • Not on file.     Social History Main Topics   • Smoking status: Never Smoker   • Smokeless tobacco: Former User     Types: Chew     Quit date: 7/21/2000   • Alcohol use No   • Drug use: No   • Sexual activity: Not on file     Other Topics Concern   • Not on file     Social History Narrative   He currently lives by himself he does have one son who lives in [place]. He is currently retired.    Family History   Problem Relation Age of Onset   • Heart disease Mother    • Hypertension Mother    • Lung disease Father      Black Lung   • Stomach cancer Father    • Cancer Brother          Current Outpatient Prescriptions:   •  aspirin 81 MG tablet, Take 81 mg by mouth daily., Disp: , Rfl:   •  diphenhydrAMINE (BENADRYL) 25 MG tablet, Take 50 mg by mouth at night as needed for itching or sleep., Disp: , Rfl:   •  docusate sodium (COLACE) 250 MG capsule, Take 250 mg by mouth Daily., Disp: , Rfl:   •  metroNIDAZOLE (METROGEL) 0.75 % gel, Apply  topically 2 (Two) Times a Day., Disp: 45 g, Rfl: 2  •  Multiple Vitamin (MULTI VITAMIN MENS) tablet, Take 1 tablet by mouth daily., Disp: , Rfl:   •  simvastatin (ZOCOR) 20 MG tablet, Take 1 tablet by mouth Every Night., Disp: 90 tablet, Rfl: 3        Review of Systems  A comprehensive 14 point review of systems was  conducted with patient and positive as per HPI otherwise negative. +occasional seasonal allergies and intermittent sinus drainage      Physical Exam  Vital Signs: These were reviewed and listed as per patient’s electronic medical chart  Vitals:    11/07/17 0932   BP: 121/77   Pulse: 72   Resp: 16   Temp: 97.2 °F (36.2 °C)   SpO2: 99%     General: Awake, alert and oriented, in no distress  HEENT: Head is atraumatic, normocephalic, extraocular movements full, oropharynx clear, no scleral icterus, pink moist mucous membranes  Neck: no jvd or masses  Cardiovascular: regular rate and rhythm without murmurs, rubs or gallops  Pulmonary: non-labored, clear to auscultation bilaterally, no wheezing  Abdomen: soft, non-tender, non-distended, normal active bowel sounds present, no organomegaly  Extremities: No clubbing, cyanosis or edema, kyphosis  Neurologic: Mental status as above, alert, awake and oriented, grossly non-focal exam  Skin: warm, dry, intact        Labs / Studies:    Office Visit on 11/07/2017   Component Date Value   • WBC 11/07/2017 4.43*   • RBC 11/07/2017 4.31*   • Hemoglobin 11/07/2017 13.8*   • Hematocrit 11/07/2017 41.1*   • MCV 11/07/2017 95.4*   • MCH 11/07/2017 32.0    • MCHC 11/07/2017 33.6    • RDW 11/07/2017 12.6    • RDW-SD 11/07/2017 44.1    • MPV 11/07/2017 10.8*   • Platelets 11/07/2017 175    • Neutrophil % 11/07/2017 56.0    • Lymphocyte % 11/07/2017 33.6    • Monocyte % 11/07/2017 8.8    • Eosinophil % 11/07/2017 1.6    • Basophil % 11/07/2017 0.0    • Immature Grans % 11/07/2017 0.0    • Neutrophils, Absolute 11/07/2017 2.48    • Lymphocytes, Absolute 11/07/2017 1.49    • Monocytes, Absolute 11/07/2017 0.39    • Eosinophils, Absolute 11/07/2017 0.07    • Basophils, Absolute 11/07/2017 0.00    • Immature Grans, Absolute 11/07/2017 0.00    Office Visit on 07/11/2017   Component Date Value   • Total Cholesterol 07/11/2017 161    • Triglycerides 07/11/2017 72    • HDL Cholesterol 07/11/2017 70     • LDL Cholesterol  07/11/2017 77    • VLDL Cholesterol 07/11/2017 14.4    • LDL/HDL Ratio 07/11/2017 1.09    • Glucose 07/11/2017 89    • BUN 07/11/2017 14    • Creatinine 07/11/2017 0.91    • Sodium 07/11/2017 142    • Potassium 07/11/2017 4.4    • Chloride 07/11/2017 106    • CO2 07/11/2017 34.5*   • Calcium 07/11/2017 9.8    • Total Protein 07/11/2017 7.3    • Albumin 07/11/2017 4.80    • ALT (SGPT) 07/11/2017 25    • AST (SGOT) 07/11/2017 29    • Alkaline Phosphatase 07/11/2017 44    • Total Bilirubin 07/11/2017 0.7    • eGFR Non African Amer 07/11/2017 83    • Globulin 07/11/2017 2.5    • A/G Ratio 07/11/2017 1.9    • BUN/Creatinine Ratio 07/11/2017 15.4    • Anion Gap 07/11/2017 1.5*   • WBC 07/11/2017 4.64    • RBC 07/11/2017 4.24*   • Hemoglobin 07/11/2017 13.7*   • Hematocrit 07/11/2017 40.7*   • MCV 07/11/2017 96.0*   • MCH 07/11/2017 32.3    • MCHC 07/11/2017 33.7    • RDW 07/11/2017 12.6    • RDW-SD 07/11/2017 43.5    • MPV 07/11/2017 10.8*   • Platelets 07/11/2017 166    • Neutrophil % 07/11/2017 57.8    • Lymphocyte % 07/11/2017 32.5    • Monocyte % 07/11/2017 6.3    • Eosinophil % 07/11/2017 3.0    • Basophil % 07/11/2017 0.2    • Immature Grans % 07/11/2017 0.2    • Neutrophils, Absolute 07/11/2017 2.68    • Lymphocytes, Absolute 07/11/2017 1.51    • Monocytes, Absolute 07/11/2017 0.29    • Eosinophils, Absolute 07/11/2017 0.14    • Basophils, Absolute 07/11/2017 0.01    • Immature Grans, Absolute 07/11/2017 0.01    • WBC 07/11/2017 4.52    • RBC 07/11/2017 4.34*   • Hemoglobin 07/11/2017 13.7*   • Hematocrit 07/11/2017 41.6*   • MCV 07/11/2017 95.9*   • MCH 07/11/2017 31.6    • MCHC 07/11/2017 32.9*   • RDW 07/11/2017 12.5    • RDW-SD 07/11/2017 44.1    • MPV 07/11/2017 11.2*   • Platelets 07/11/2017 161    • Neutrophil % 07/11/2017 57.6    • Lymphocyte % 07/11/2017 31.4    • Monocyte % 07/11/2017 8.2    • Eosinophil % 07/11/2017 2.4    • Basophil % 07/11/2017 0.2    • Immature Grans % 07/11/2017  0.2    • Neutrophils, Absolute 07/11/2017 2.60    • Lymphocytes, Absolute 07/11/2017 1.42    • Monocytes, Absolute 07/11/2017 0.37    • Eosinophils, Absolute 07/11/2017 0.11    • Basophils, Absolute 07/11/2017 0.01    • Immature Grans, Absolute 07/11/2017 0.01    • Osmolality Calc 07/11/2017 283.1           PATHOLOGY:  03/15/17: Peripheral Smear:            Assessment/Plan   Jem Crowder is a very pleasant 67 y.o.  male who presents in follow up appointment at the request of Dr. Khanna for further management of thrombocytopenia and leukopenia.     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 His platelet count since then has also normal. I did also obtain a peripheral smear for further evaluation which showed mild leukopenia with normal platelet count and his total white blood cell count is within normal limits on CBC. I did also assess for nutritional deficiencies that may be contributing such as B12 and folate which were normal. CHLOE was normal. I did also assess for underlying inflammation which may be suppressing bone marrow which were also normal. Given that thrombocytopenia appears to be resolved he can continue to follow with his primary care physician with repeat check every 6 months to ensure stability.    Leukopenia  This appears to have resolved along with thrombocytopenia, although low normal this may be normal for the patient. This is slightly below normal limits today however given only mild decrease and that he is asymptomatic this can be monitored with his primary provider. If however continues to decrease and is concerning will need to follow up.    Leg Cramps  I did check a BMP and magnesium level which were normal. I did discuss with the patient  that he should further discuss with his primary physician in the event that his statin may be contributing.    Healthcare Maintenance  Patient has not yet received the flu shot this year, therefore will administer this in our clinic today. He is unsure as to how long it has been since his pneumonia vaccine and will further discuss this with Dr. Khanna. He had a colonoscopy 10/1/15 and was found to have internal hemorrhoids with a few diverticuli and repeat colonoscopy was recommended in about 10 years from that date.    I will have the patient return in follow up appointment on a as needed basis as his WBC (although mildly low today) and platelet count have remained normal. He understands that should he have any questions or concerns or counts should again decline he should give us a call at any time and I would be happy to see him at anytime. It was a pleasure to see this patient in clinic today, thank you for allowing me to participate in the care of this patient.    I spent 25 minutes in regards to this patient’s care today. More than 18 minutes of the time was spent in direct interaction with the patient for the above problems.        Pat Hooks MD  11/07/2017  10:27 AM

## 2018-01-29 ENCOUNTER — LAB (OUTPATIENT)
Dept: FAMILY MEDICINE CLINIC | Facility: CLINIC | Age: 68
End: 2018-01-29

## 2018-01-29 DIAGNOSIS — Z11.59 NEED FOR HEPATITIS C SCREENING TEST: ICD-10-CM

## 2018-01-29 DIAGNOSIS — E78.2 MIXED HYPERLIPIDEMIA: ICD-10-CM

## 2018-01-29 DIAGNOSIS — Z12.5 SCREENING FOR PROSTATE CANCER: ICD-10-CM

## 2018-01-29 PROCEDURE — 36415 COLL VENOUS BLD VENIPUNCTURE: CPT | Performed by: FAMILY MEDICINE

## 2018-01-30 LAB
ALBUMIN SERPL-MCNC: 4.8 G/DL (ref 3.4–4.8)
ALBUMIN/GLOB SERPL: 2.2 G/DL (ref 1.5–2.5)
ALP SERPL-CCNC: 39 U/L (ref 40–129)
ALT SERPL-CCNC: 27 U/L (ref 10–44)
AST SERPL-CCNC: 29 U/L (ref 10–34)
BILIRUB SERPL-MCNC: 0.8 MG/DL (ref 0.2–1.8)
BUN SERPL-MCNC: 28 MG/DL (ref 7–21)
BUN/CREAT SERPL: 26.4 (ref 7–25)
CALCIUM SERPL-MCNC: 9.6 MG/DL (ref 7.7–10)
CHLORIDE SERPL-SCNC: 106 MMOL/L (ref 99–112)
CHOLEST SERPL-MCNC: 158 MG/DL (ref 0–200)
CO2 SERPL-SCNC: 28.3 MMOL/L (ref 24.3–31.9)
CREAT SERPL-MCNC: 1.06 MG/DL (ref 0.43–1.29)
GLOBULIN SER CALC-MCNC: 2.2 GM/DL
GLUCOSE SERPL-MCNC: 82 MG/DL (ref 70–110)
HCV AB S/CO SERPL IA: 0.1 S/CO RATIO (ref 0–0.9)
HDLC SERPL-MCNC: 74 MG/DL (ref 60–100)
LDLC SERPL CALC-MCNC: 70 MG/DL (ref 0–100)
POTASSIUM SERPL-SCNC: 4.8 MMOL/L (ref 3.5–5.3)
PROT SERPL-MCNC: 7 G/DL (ref 6–8)
PSA SERPL-MCNC: 0.92 NG/ML (ref 0–4)
SODIUM SERPL-SCNC: 143 MMOL/L (ref 135–153)
TRIGL SERPL-MCNC: 70 MG/DL (ref 0–150)
VLDLC SERPL CALC-MCNC: 14 MG/DL

## 2018-02-05 ENCOUNTER — OFFICE VISIT (OUTPATIENT)
Dept: FAMILY MEDICINE CLINIC | Facility: CLINIC | Age: 68
End: 2018-02-05

## 2018-02-05 VITALS
HEIGHT: 70 IN | HEART RATE: 64 BPM | DIASTOLIC BLOOD PRESSURE: 71 MMHG | SYSTOLIC BLOOD PRESSURE: 125 MMHG | WEIGHT: 167.5 LBS | TEMPERATURE: 97.3 F | BODY MASS INDEX: 23.98 KG/M2

## 2018-02-05 DIAGNOSIS — Z13.6 ENCOUNTER FOR ABDOMINAL AORTIC ANEURYSM (AAA) SCREENING: ICD-10-CM

## 2018-02-05 DIAGNOSIS — D69.6 THROMBOCYTOPENIA (HCC): ICD-10-CM

## 2018-02-05 DIAGNOSIS — E78.2 MIXED HYPERLIPIDEMIA: Primary | ICD-10-CM

## 2018-02-05 DIAGNOSIS — Z00.00 MEDICARE ANNUAL WELLNESS VISIT, SUBSEQUENT: ICD-10-CM

## 2018-02-05 PROCEDURE — 99213 OFFICE O/P EST LOW 20 MIN: CPT | Performed by: FAMILY MEDICINE

## 2018-02-05 PROCEDURE — G0439 PPPS, SUBSEQ VISIT: HCPCS | Performed by: FAMILY MEDICINE

## 2018-02-05 RX ORDER — SIMVASTATIN 20 MG
20 TABLET ORAL NIGHTLY
Qty: 90 TABLET | Refills: 3 | Status: SHIPPED | OUTPATIENT
Start: 2018-02-05 | End: 2019-05-06 | Stop reason: SDUPTHER

## 2018-02-05 NOTE — PROGRESS NOTES
Subjective     Chief Complaint   Patient presents with   • Medicare Wellness Visit   • Hyperlipidemia       Jem Crowder is a 67 y.o. male.     History of Present Illness follow-up regarding lipids recent metabolic monitoring.  Overall is doing well.  Stays aggressively active.  Maintain heart healthy diet.  Current on PME.  Compliant with medications.    The following portions of the patient's history were reviewed and updated as appropriate: allergies, current medications, past family history, past social history, past surgical history and problem list.    Review of Systems   Constitutional: Negative.    HENT: Negative.    Eyes: Negative.    Respiratory: Negative.    Cardiovascular: Negative.    Gastrointestinal: Negative.    Endocrine: Negative.    Genitourinary: Negative.    Musculoskeletal: Negative.    Skin: Negative.    Allergic/Immunologic: Negative.    Neurological: Negative.    Hematological: Negative.    Psychiatric/Behavioral: Negative.        Objective   Physical Exam   Constitutional: He is oriented to person, place, and time. He appears well-developed and well-nourished.   HENT:   Head: Normocephalic.   Right Ear: External ear normal.   Left Ear: External ear normal.   Mouth/Throat: Oropharynx is clear and moist.   Eyes: Conjunctivae and EOM are normal. Pupils are equal, round, and reactive to light.   Neck: Normal range of motion. Neck supple. No tracheal deviation present. No thyromegaly present.   Cardiovascular: Normal rate, regular rhythm, normal heart sounds and intact distal pulses.    No murmur heard.  Pulmonary/Chest: Effort normal and breath sounds normal.   Musculoskeletal: He exhibits no edema.   Stable kyphosis   Lymphadenopathy:     He has no cervical adenopathy.   Neurological: He is alert and oriented to person, place, and time.   Skin: Skin is warm and dry.   Psychiatric: He has a normal mood and affect.   Vitals reviewed.    /71 (BP Location: Left arm, Patient Position:  "Sitting, Cuff Size: Adult)  Pulse 64  Temp 97.3 °F (36.3 °C) (Oral)   Ht 177.8 cm (70\")  Wt 76 kg (167 lb 8 oz)  BMI 24.03 kg/m2  Assessment/Plan   Jem was seen today for medicare wellness visit and hyperlipidemia.    Diagnoses and all orders for this visit:    Mixed hyperlipidemia  -     CBC & Differential; Future  -     Comprehensive Metabolic Panel; Future  -     Lipid Panel; Future  -     simvastatin (ZOCOR) 20 MG tablet; Take 1 tablet by mouth Every Night.    Encounter for abdominal aortic aneurysm (AAA) screening  -     Abdominal Aortic Aneurysm Screening Medicare CAR; Future    Medicare annual wellness visit, subsequent  -     Abdominal Aortic Aneurysm Screening Medicare CAR; Future    Thrombocytopenia  -     CBC & Differential; Future      We reviewed recent metabolic parameters.  All results are quite remarkably good.  Will continue medications as reconciled ordered.  Will ask for AAA screening ultrasound in light of the dyslipidemia and age and historical nicotine use.  Recheck in about 6 months or as needed.  I note that you did visit with hematologist.         "

## 2018-02-05 NOTE — PROGRESS NOTES
QUICK REFERENCE INFORMATION:  The ABCs of the Annual Wellness Visit    Subsequent Medicare Wellness Visit    HEALTH RISK ASSESSMENT    1950    Recent Hospitalizations:  No hospitalization(s) within the last year..        Current Medical Providers:  Patient Care Team:  Marek Khanna MD as PCP - General  Pat Hooks MD as Consulting Physician (Hematology and Oncology)        Smoking Status:  History   Smoking Status   • Never Smoker   Smokeless Tobacco   • Former User   • Types: Chew   • Quit date: 7/21/2000       Alcohol Consumption:  History   Alcohol Use No       Depression Screen:   PHQ-2/PHQ-9 Depression Screening 2/5/2018   Little interest or pleasure in doing things 0   Feeling down, depressed, or hopeless 0   Trouble falling or staying asleep, or sleeping too much -   Feeling tired or having little energy -   Poor appetite or overeating -   Feeling bad about yourself - or that you are a failure or have let yourself or your family down -   Trouble concentrating on things, such as reading the newspaper or watching television -   Moving or speaking so slowly that other people could have noticed. Or the opposite - being so fidgety or restless that you have been moving around a lot more than usual -   Thoughts that you would be better off dead, or of hurting yourself in some way -   Total Score 0       Health Habits and Functional and Cognitive Screening:  Functional & Cognitive Status 2/5/2018   Do you have difficulty preparing food and eating? No   Do you have difficulty bathing yourself, getting dressed or grooming yourself? No   Do you have difficulty using the toilet? No   Do you have difficulty moving around from place to place? No   Do you have trouble with steps or getting out of a bed or a chair? No   In the past year have you fallen or experienced a near fall? No   Current Diet Well Balanced Diet   Dental Exam Up to date   Eye Exam Not up to date   Exercise (times per week) 7 times  per week   Current Exercise Activities Include Walking   Do you need help using the phone?  No   Are you deaf or do you have serious difficulty hearing?  No   Do you need help with transportation? No   Do you need help shopping? No   Do you need help preparing meals?  No   Do you need help with housework?  No   Do you need help with laundry? No   Do you need help taking your medications? No   Do you need help managing money? No   Have you felt unusual stress, anger or loneliness in the last month? No   Who do you live with? Alone   If you need help, do you have trouble finding someone available to you? No   Have you been bothered in the last four weeks by sexual problems? No   Do you have difficulty concentrating, remembering or making decisions? No           Does the patient have evidence of cognitive impairment? No    Aspirin use counseling: Taking ASA appropriately as indicated      Recent Lab Results:  CMP:  Lab Results   Component Value Date    GLU 82 01/29/2018    BUN 28 (H) 01/29/2018    CREATININE 1.06 01/29/2018    EGFRIFNONA 83 07/11/2017    BCR 26.4 (H) 01/29/2018     01/29/2018    K 4.8 01/29/2018    CO2 28.3 01/29/2018    CALCIUM 9.6 01/29/2018    PROTENTOTREF 7.0 01/29/2018    ALBUMIN 4.80 01/29/2018    LABGLOBREF 2.2 01/29/2018    LABIL2 2.2 01/29/2018    BILITOT 0.8 01/29/2018    ALKPHOS 39 (L) 01/29/2018    AST 29 01/29/2018    ALT 27 01/29/2018     Lipid Panel:  Lab Results   Component Value Date    CHOL 161 07/11/2017    TRIG 70 01/29/2018    HDL 74 01/29/2018    VLDL 14 01/29/2018    LDLCALC 77 07/11/2017    LDLHDL 1.09 07/11/2017     HbA1c:       Visual Acuity:   Visual Acuity Screening    Right eye Left eye Both eyes   Without correction:      With correction: 20/100 20/30 20/30       Age-appropriate Screening Schedule:  Refer to the list below for future screening recommendations based on patient's age, sex and/or medical conditions. Orders for these recommended tests are listed in the  plan section. The patient has been provided with a written plan.    Health Maintenance   Topic Date Due   • TDAP/TD VACCINES (1 - Tdap) 07/20/2012   • LIPID PANEL  01/29/2019   • COLONOSCOPY  10/01/2025   • INFLUENZA VACCINE  Completed   • PNEUMOCOCCAL VACCINES (65+ LOW/MEDIUM RISK)  Completed   • ZOSTER VACCINE  Completed        Subjective   History of Present Illness    Jem Crowder is a 67 y.o. male who presents for an Subsequent Wellness Visit.    The following portions of the patient's history were reviewed and updated as appropriate: allergies, current medications, past medical history, past social history, past surgical history and problem list.    Outpatient Medications Prior to Visit   Medication Sig Dispense Refill   • aspirin 81 MG tablet Take 81 mg by mouth daily.     • diphenhydrAMINE (BENADRYL) 25 MG tablet Take 50 mg by mouth at night as needed for itching or sleep.     • docusate sodium (COLACE) 250 MG capsule Take 250 mg by mouth Daily.     • metroNIDAZOLE (METROGEL) 0.75 % gel Apply  topically 2 (Two) Times a Day. 45 g 2   • Multiple Vitamin (MULTI VITAMIN MENS) tablet Take 1 tablet by mouth daily.     • simvastatin (ZOCOR) 20 MG tablet Take 1 tablet by mouth Every Night. 90 tablet 3     No facility-administered medications prior to visit.        Patient Active Problem List   Diagnosis   • Chronic coronary artery disease   • Diverticulosis of sigmoid colon   • Hyperlipidemia   • Internal hemorrhoids   • Rosacea   • Thrombocytopenia       Advance Care Planning:  has an advance directive - a copy has been provided and is in file    Identification of Risk Factors:  Risk factors include: cardiovascular risk.    Review of Systems    Compared to one year ago, the patient feels his physical health is the same.  Compared to one year ago, the patient feels his mental health is the same.    Objective     Physical Exam    Vitals:    02/05/18 0920   BP: 125/71   BP Location: Left arm   Patient Position:  "Sitting   Cuff Size: Adult   Pulse: 64   Temp: 97.3 °F (36.3 °C)   TempSrc: Oral   Weight: 76 kg (167 lb 8 oz)   Height: 177.8 cm (70\")       Body mass index is 24.03 kg/(m^2).  Discussed the patient's BMI with him. BMI is within normal parameters. No follow-up required.    Assessment/Plan   Patient Self-Management and Personalized Health Advice  The patient has been provided with information about: diet, exercise and prevention of cardiac or vascular disease and preventive services including:   · Prostate cancer screening discussed, Screening for AAA, referral for ultrasound placed.    Visit Diagnoses:    ICD-10-CM ICD-9-CM   1. Mixed hyperlipidemia E78.2 272.2       No orders of the defined types were placed in this encounter.      Outpatient Encounter Prescriptions as of 2/5/2018   Medication Sig Dispense Refill   • aspirin 81 MG tablet Take 81 mg by mouth daily.     • diphenhydrAMINE (BENADRYL) 25 MG tablet Take 50 mg by mouth at night as needed for itching or sleep.     • docusate sodium (COLACE) 250 MG capsule Take 250 mg by mouth Daily.     • metroNIDAZOLE (METROGEL) 0.75 % gel Apply  topically 2 (Two) Times a Day. 45 g 2   • Multiple Vitamin (MULTI VITAMIN MENS) tablet Take 1 tablet by mouth daily.     • simvastatin (ZOCOR) 20 MG tablet Take 1 tablet by mouth Every Night. 90 tablet 3     No facility-administered encounter medications on file as of 2/5/2018.        Reviewed use of high risk medication in the elderly: not applicable  Reviewed for potential of harmful drug interactions in the elderly: not applicable    Follow Up:  No Follow-up on file.     An After Visit Summary and PPPS with all of these plans were given to the patient.        Overall things appear to be very stable.  Will ask for AAA screening ultrasound in light of dyslipidemia prior nicotine use as well as questionable history of CAD.  Stay safely active maintain medications as reconciled ordered.  Follow-up in about 6 months or as needed.  " You are doing remarkably well.

## 2018-02-09 ENCOUNTER — HOSPITAL ENCOUNTER (OUTPATIENT)
Dept: ULTRASOUND IMAGING | Facility: HOSPITAL | Age: 68
Discharge: HOME | End: 2018-02-09

## 2018-02-09 DIAGNOSIS — Z00.00 MEDICARE ANNUAL WELLNESS VISIT, SUBSEQUENT: ICD-10-CM

## 2018-02-09 DIAGNOSIS — Z13.6 ENCOUNTER FOR ABDOMINAL AORTIC ANEURYSM (AAA) SCREENING: ICD-10-CM

## 2018-02-09 PROCEDURE — 76775 US EXAM ABDO BACK WALL LIM: CPT

## 2018-02-09 PROCEDURE — 76706 US ABDL AORTA SCREEN AAA: CPT | Performed by: RADIOLOGY

## 2018-08-01 ENCOUNTER — LAB (OUTPATIENT)
Dept: FAMILY MEDICINE CLINIC | Facility: CLINIC | Age: 68
End: 2018-08-01

## 2018-08-01 DIAGNOSIS — D69.6 THROMBOCYTOPENIA (HCC): ICD-10-CM

## 2018-08-01 DIAGNOSIS — E78.2 MIXED HYPERLIPIDEMIA: ICD-10-CM

## 2018-08-01 LAB
ALBUMIN SERPL-MCNC: 4.6 G/DL (ref 3.4–4.8)
ALBUMIN/GLOB SERPL: 1.9 G/DL (ref 1.5–2.5)
ALP SERPL-CCNC: 51 U/L (ref 40–129)
ALT SERPL-CCNC: 38 U/L (ref 10–44)
AST SERPL-CCNC: 32 U/L (ref 10–34)
BASOPHILS # BLD AUTO: 0.02 10*3/MM3 (ref 0–0.3)
BASOPHILS NFR BLD AUTO: 0.4 % (ref 0–2)
BILIRUB SERPL-MCNC: 0.7 MG/DL (ref 0.2–1.8)
BUN SERPL-MCNC: 23 MG/DL (ref 7–21)
BUN/CREAT SERPL: 23 (ref 7–25)
CALCIUM SERPL-MCNC: 9.3 MG/DL (ref 7.7–10)
CHLORIDE SERPL-SCNC: 106 MMOL/L (ref 99–112)
CHOLEST SERPL-MCNC: 162 MG/DL (ref 0–200)
CO2 SERPL-SCNC: 33.1 MMOL/L (ref 24.3–31.9)
CREAT SERPL-MCNC: 1 MG/DL (ref 0.43–1.29)
EOSINOPHIL # BLD AUTO: 0.21 10*3/MM3 (ref 0–0.7)
EOSINOPHIL NFR BLD AUTO: 3.8 % (ref 0–7)
ERYTHROCYTE [DISTWIDTH] IN BLOOD BY AUTOMATED COUNT: 12.4 % (ref 11.5–14.5)
GLOBULIN SER CALC-MCNC: 2.4 GM/DL
GLUCOSE SERPL-MCNC: 82 MG/DL (ref 70–110)
HCT VFR BLD AUTO: 42.8 % (ref 42–52)
HDLC SERPL-MCNC: 73 MG/DL (ref 60–100)
HGB BLD-MCNC: 14.2 G/DL (ref 14–18)
IMM GRANULOCYTES # BLD: 0.01 10*3/MM3 (ref 0–0.03)
IMM GRANULOCYTES NFR BLD: 0.2 % (ref 0–0.5)
LDLC SERPL CALC-MCNC: 78 MG/DL (ref 0–100)
LYMPHOCYTES # BLD AUTO: 1.65 10*3/MM3 (ref 1–3)
LYMPHOCYTES NFR BLD AUTO: 29.6 % (ref 16–46)
MCH RBC QN AUTO: 32.8 PG (ref 27–33)
MCHC RBC AUTO-ENTMCNC: 33.2 G/DL (ref 33–37)
MCV RBC AUTO: 98.8 FL (ref 80–94)
MONOCYTES # BLD AUTO: 0.45 10*3/MM3 (ref 0.1–0.9)
MONOCYTES NFR BLD AUTO: 8.1 % (ref 0–12)
NEUTROPHILS # BLD AUTO: 3.23 10*3/MM3 (ref 1.4–6.5)
NEUTROPHILS NFR BLD AUTO: 57.9 % (ref 40–75)
PLATELET # BLD AUTO: 97 10*3/MM3 (ref 130–400)
POTASSIUM SERPL-SCNC: 4.8 MMOL/L (ref 3.5–5.3)
PROT SERPL-MCNC: 7 G/DL (ref 6–8)
RBC # BLD AUTO: 4.33 10*6/MM3 (ref 4.7–6.1)
SODIUM SERPL-SCNC: 144 MMOL/L (ref 135–153)
TRIGL SERPL-MCNC: 55 MG/DL (ref 0–150)
VLDLC SERPL CALC-MCNC: 11 MG/DL
WBC # BLD AUTO: 5.57 10*3/MM3 (ref 4.5–12.5)

## 2018-08-01 PROCEDURE — 36415 COLL VENOUS BLD VENIPUNCTURE: CPT | Performed by: FAMILY MEDICINE

## 2018-08-08 ENCOUNTER — OFFICE VISIT (OUTPATIENT)
Dept: FAMILY MEDICINE CLINIC | Facility: CLINIC | Age: 68
End: 2018-08-08

## 2018-08-08 VITALS
WEIGHT: 167.8 LBS | TEMPERATURE: 97.2 F | BODY MASS INDEX: 24.02 KG/M2 | HEART RATE: 58 BPM | HEIGHT: 70 IN | SYSTOLIC BLOOD PRESSURE: 124 MMHG | OXYGEN SATURATION: 98 % | DIASTOLIC BLOOD PRESSURE: 72 MMHG

## 2018-08-08 DIAGNOSIS — Z12.5 SCREENING FOR PROSTATE CANCER: ICD-10-CM

## 2018-08-08 DIAGNOSIS — E78.2 MIXED HYPERLIPIDEMIA: Primary | ICD-10-CM

## 2018-08-08 DIAGNOSIS — Z23 NEED FOR DIPHTHERIA-TETANUS-PERTUSSIS (TDAP) VACCINE: ICD-10-CM

## 2018-08-08 DIAGNOSIS — D69.6 THROMBOCYTOPENIA (HCC): ICD-10-CM

## 2018-08-08 PROCEDURE — 90471 IMMUNIZATION ADMIN: CPT | Performed by: FAMILY MEDICINE

## 2018-08-08 PROCEDURE — 99214 OFFICE O/P EST MOD 30 MIN: CPT | Performed by: FAMILY MEDICINE

## 2018-08-08 PROCEDURE — 90715 TDAP VACCINE 7 YRS/> IM: CPT | Performed by: FAMILY MEDICINE

## 2018-08-08 NOTE — PROGRESS NOTES
Subjective     Chief Complaint   Patient presents with   • blood work review   • mixed hyperlipidemia       Jem Crowder is a 68 y.o. male.     History of Present Illness follow-up regarding dyslipidemia recent metabolic monitoring.  Having no new problems.  Tolerating medications as reconciled.  Staying active.  Maintain heart healthy diet.  Current on PME.    The following portions of the patient's history were reviewed and updated as appropriate: allergies, past family history, past medical history, past social history, past surgical history and problem list.    Review of Systems   Constitutional: Negative.    HENT: Negative.    Eyes: Negative.    Respiratory: Negative.    Cardiovascular: Negative.    Gastrointestinal: Negative.    Endocrine: Negative.    Genitourinary: Negative.    Musculoskeletal: Negative.    Skin: Negative.    Allergic/Immunologic: Negative.    Neurological: Negative.    Hematological: Negative.    Psychiatric/Behavioral: Negative.        Objective   Physical Exam   Constitutional: He is oriented to person, place, and time. He appears well-developed and well-nourished.   HENT:   Head: Normocephalic.   Right Ear: External ear normal.   Left Ear: External ear normal.   Mouth/Throat: Oropharynx is clear and moist.   Eyes: Pupils are equal, round, and reactive to light. Conjunctivae and EOM are normal.   Neck: Normal range of motion. Neck supple. No tracheal deviation present. No thyromegaly present.   Cardiovascular: Normal rate, regular rhythm and normal heart sounds.    No murmur heard.  Pulmonary/Chest: Effort normal and breath sounds normal.   Musculoskeletal: He exhibits no edema.   Stable kyphosis   Neurological: He is alert and oriented to person, place, and time.   Skin: Skin is warm and dry.   Psychiatric: He has a normal mood and affect.   Vitals reviewed.    /72 (BP Location: Right arm, Patient Position: Sitting, Cuff Size: Adult)   Pulse 58   Temp 97.2 °F (36.2 °C) (Oral)    "Ht 177.8 cm (70\")   Wt 76.1 kg (167 lb 12.8 oz)   SpO2 98%   BMI 24.08 kg/m²   Assessment/Plan   Jem was seen today for blood work review and mixed hyperlipidemia.    Diagnoses and all orders for this visit:    Mixed hyperlipidemia  -     CBC & Differential; Future  -     Comprehensive Metabolic Panel; Future  -     Lipid Panel; Future    Thrombocytopenia (CMS/HCC)  -     CBC & Differential; Future    Need for diphtheria-tetanus-pertussis (Tdap) vaccine  -     Tdap Vaccine Greater Than or Equal To 8yo IM    Screening for prostate cancer  -     PSA Screen; Future    Overall you seem to be done quite well.  Continue aggressive TLC medications as reconciled ordered.  Labs are very good results.  After consent tetanus booster given.  Recheck in about 6 months or as needed.  Flu vaccine sooner consider the new shingles vaccine.           "

## 2019-02-11 ENCOUNTER — LAB (OUTPATIENT)
Dept: FAMILY MEDICINE CLINIC | Facility: CLINIC | Age: 69
End: 2019-02-11

## 2019-02-11 DIAGNOSIS — E78.2 MIXED HYPERLIPIDEMIA: ICD-10-CM

## 2019-02-11 DIAGNOSIS — Z12.5 SCREENING FOR PROSTATE CANCER: ICD-10-CM

## 2019-02-11 DIAGNOSIS — D69.6 THROMBOCYTOPENIA (HCC): ICD-10-CM

## 2019-02-11 LAB
ALBUMIN SERPL-MCNC: 4.7 G/DL (ref 3.4–4.8)
ALBUMIN/GLOB SERPL: 2.1 G/DL (ref 1.5–2.5)
ALP SERPL-CCNC: 42 U/L (ref 40–129)
ALT SERPL-CCNC: 41 U/L (ref 10–44)
AST SERPL-CCNC: 37 U/L (ref 10–34)
BASOPHILS # BLD AUTO: 0.02 10*3/MM3 (ref 0–0.3)
BASOPHILS NFR BLD AUTO: 0.4 % (ref 0–2)
BILIRUB SERPL-MCNC: 0.7 MG/DL (ref 0.2–1.8)
BUN SERPL-MCNC: 23 MG/DL (ref 7–21)
BUN/CREAT SERPL: 24.7 (ref 7–25)
CALCIUM SERPL-MCNC: 9.3 MG/DL (ref 7.7–10)
CHLORIDE SERPL-SCNC: 108 MMOL/L (ref 99–112)
CHOLEST SERPL-MCNC: 158 MG/DL (ref 0–200)
CO2 SERPL-SCNC: 32.7 MMOL/L (ref 24.3–31.9)
CREAT SERPL-MCNC: 0.93 MG/DL (ref 0.43–1.29)
EOSINOPHIL # BLD AUTO: 0.22 10*3/MM3 (ref 0–0.7)
EOSINOPHIL NFR BLD AUTO: 3.9 % (ref 0–7)
ERYTHROCYTE [DISTWIDTH] IN BLOOD BY AUTOMATED COUNT: 12.8 % (ref 11.5–14.5)
GLOBULIN SER CALC-MCNC: 2.2 GM/DL
GLUCOSE SERPL-MCNC: 77 MG/DL (ref 70–110)
HCT VFR BLD AUTO: 42 % (ref 42–52)
HDLC SERPL-MCNC: 75 MG/DL (ref 60–100)
HGB BLD-MCNC: 13.9 G/DL (ref 14–18)
IMM GRANULOCYTES # BLD AUTO: 0.01 10*3/MM3 (ref 0–0.03)
IMM GRANULOCYTES NFR BLD AUTO: 0.2 % (ref 0–0.5)
LDLC SERPL CALC-MCNC: 69 MG/DL (ref 0–100)
LYMPHOCYTES # BLD AUTO: 1.62 10*3/MM3 (ref 1–3)
LYMPHOCYTES NFR BLD AUTO: 28.6 % (ref 16–46)
MCH RBC QN AUTO: 32.7 PG (ref 27–33)
MCHC RBC AUTO-ENTMCNC: 33.1 G/DL (ref 33–37)
MCV RBC AUTO: 98.8 FL (ref 80–94)
MONOCYTES # BLD AUTO: 0.57 10*3/MM3 (ref 0.1–0.9)
MONOCYTES NFR BLD AUTO: 10.1 % (ref 0–12)
NEUTROPHILS # BLD AUTO: 3.23 10*3/MM3 (ref 1.4–6.5)
NEUTROPHILS NFR BLD AUTO: 56.8 % (ref 40–75)
PLATELET # BLD AUTO: 105 10*3/MM3 (ref 130–400)
POTASSIUM SERPL-SCNC: 4.4 MMOL/L (ref 3.5–5.3)
PROT SERPL-MCNC: 6.9 G/DL (ref 6–8)
PSA SERPL-MCNC: 1.06 NG/ML (ref 0–4)
RBC # BLD AUTO: 4.25 10*6/MM3 (ref 4.7–6.1)
SODIUM SERPL-SCNC: 144 MMOL/L (ref 135–153)
TRIGL SERPL-MCNC: 70 MG/DL (ref 0–150)
VLDLC SERPL CALC-MCNC: 14 MG/DL
WBC # BLD AUTO: 5.67 10*3/MM3 (ref 4.5–12.5)

## 2019-02-11 PROCEDURE — 36415 COLL VENOUS BLD VENIPUNCTURE: CPT | Performed by: FAMILY MEDICINE

## 2019-02-18 ENCOUNTER — OFFICE VISIT (OUTPATIENT)
Dept: FAMILY MEDICINE CLINIC | Facility: CLINIC | Age: 69
End: 2019-02-18

## 2019-02-18 VITALS
SYSTOLIC BLOOD PRESSURE: 133 MMHG | TEMPERATURE: 97.1 F | HEIGHT: 70 IN | BODY MASS INDEX: 24.2 KG/M2 | DIASTOLIC BLOOD PRESSURE: 86 MMHG | WEIGHT: 169 LBS | HEART RATE: 64 BPM

## 2019-02-18 DIAGNOSIS — D69.6 THROMBOCYTOPENIA (HCC): ICD-10-CM

## 2019-02-18 DIAGNOSIS — Z00.00 MEDICARE ANNUAL WELLNESS VISIT, SUBSEQUENT: ICD-10-CM

## 2019-02-18 DIAGNOSIS — E78.2 MIXED HYPERLIPIDEMIA: Primary | ICD-10-CM

## 2019-02-18 PROCEDURE — G0439 PPPS, SUBSEQ VISIT: HCPCS | Performed by: FAMILY MEDICINE

## 2019-02-18 PROCEDURE — 96160 PT-FOCUSED HLTH RISK ASSMT: CPT | Performed by: FAMILY MEDICINE

## 2019-02-18 NOTE — PROGRESS NOTES
QUICK REFERENCE INFORMATION:  The ABCs of the Annual Wellness Visit    Subsequent Medicare Wellness Visit    HEALTH RISK ASSESSMENT    1950    Recent Hospitalizations:  No hospitalization(s) within the last year..        Current Medical Providers:  Patient Care Team:  Marek Khanna MD as PCP - General  Pat Hooks MD as Consulting Physician (Hematology and Oncology)        Smoking Status:  Social History     Tobacco Use   Smoking Status Never Smoker   Smokeless Tobacco Former User   • Types: Chew       Alcohol Consumption:  Social History     Substance and Sexual Activity   Alcohol Use No       Depression Screen:   PHQ-2/PHQ-9 Depression Screening 2/18/2019   Little interest or pleasure in doing things 0   Feeling down, depressed, or hopeless 0   Trouble falling or staying asleep, or sleeping too much -   Feeling tired or having little energy -   Poor appetite or overeating -   Feeling bad about yourself - or that you are a failure or have let yourself or your family down -   Trouble concentrating on things, such as reading the newspaper or watching television -   Moving or speaking so slowly that other people could have noticed. Or the opposite - being so fidgety or restless that you have been moving around a lot more than usual -   Thoughts that you would be better off dead, or of hurting yourself in some way -   Total Score 0       Health Habits and Functional and Cognitive Screening:  Functional & Cognitive Status 2/5/2018   Do you have difficulty preparing food and eating? No   Do you have difficulty bathing yourself, getting dressed or grooming yourself? No   Do you have difficulty using the toilet? No   Do you have difficulty moving around from place to place? No   Do you have trouble with steps or getting out of a bed or a chair? No   In the past year have you fallen or experienced a near fall? No   Current Diet Well Balanced Diet   Dental Exam Up to date   Eye Exam Not up to date    Exercise (times per week) 7 times per week   Current Exercise Activities Include Walking   Do you need help using the phone?  No   Are you deaf or do you have serious difficulty hearing?  No   Do you need help with transportation? No   Do you need help shopping? No   Do you need help preparing meals?  No   Do you need help with housework?  No   Do you need help with laundry? No   Do you need help taking your medications? No   Do you need help managing money? No   Have you felt unusual stress, anger or loneliness in the last month? No   Who do you live with? Alone   If you need help, do you have trouble finding someone available to you? No   Have you been bothered in the last four weeks by sexual problems? No   Do you have difficulty concentrating, remembering or making decisions? No           Does the patient have evidence of cognitive impairment? No    Aspirin use counseling: Taking ASA appropriately as indicated      Recent Lab Results:  CMP:  Lab Results   Component Value Date    GLU 77 02/11/2019    BUN 23 (H) 02/11/2019    CREATININE 0.93 02/11/2019    EGFRIFNONA 83 07/11/2017    BCR 24.7 02/11/2019     02/11/2019    K 4.4 02/11/2019    CO2 32.7 (H) 02/11/2019    CALCIUM 9.3 02/11/2019    PROTENTOTREF 6.9 02/11/2019    ALBUMIN 4.70 02/11/2019    LABGLOBREF 2.2 02/11/2019    LABIL2 2.1 02/11/2019    BILITOT 0.7 02/11/2019    ALKPHOS 42 02/11/2019    AST 37 (H) 02/11/2019    ALT 41 02/11/2019     Lipid Panel:  Lab Results   Component Value Date    CHOL 161 07/11/2017    TRIG 70 02/11/2019    HDL 75 02/11/2019    VLDL 14 02/11/2019    LDLHDL 1.09 07/11/2017     HbA1c:       Visual Acuity:  No exam data present    Age-appropriate Screening Schedule:  Refer to the list below for future screening recommendations based on patient's age, sex and/or medical conditions. Orders for these recommended tests are listed in the plan section. The patient has been provided with a written plan.    Health Maintenance    Topic Date Due   • ZOSTER VACCINE (2 of 2) 09/24/2012   • LIPID PANEL  02/11/2020   • COLONOSCOPY  10/01/2025   • TDAP/TD VACCINES (2 - Td) 08/08/2028   • INFLUENZA VACCINE  Completed   • PNEUMOCOCCAL VACCINES (65+ LOW/MEDIUM RISK)  Completed        Subjective   History of Present Illness follow-up regarding dyslipidemia acne recent metabolic screening Medicare wellness.  Overall is having no significant new problems.  Utilizing medications as reconciled.  Denies respiratory CDV GI  orthopedic skin concerns.  Stays active.  Maintain heart healthy diet.  Current on colonoscopy.  Current on vision.  Current on vaccines.    Jem Crowder is a 68 y.o. male who presents for an Subsequent Wellness Visit.    The following portions of the patient's history were reviewed and updated as appropriate: allergies, current medications, past family history, past social history, past surgical history and problem list.    Outpatient Medications Prior to Visit   Medication Sig Dispense Refill   • aspirin 81 MG tablet Take 81 mg by mouth daily.     • diphenhydrAMINE (BENADRYL) 25 MG tablet Take 50 mg by mouth at night as needed for itching or sleep.     • docusate sodium (COLACE) 250 MG capsule Take 250 mg by mouth Daily.     • metroNIDAZOLE (METROGEL) 0.75 % gel Apply  topically 2 (Two) Times a Day. 45 g 2   • Multiple Vitamin (MULTI VITAMIN MENS) tablet Take 1 tablet by mouth daily.     • simvastatin (ZOCOR) 20 MG tablet Take 1 tablet by mouth Every Night. 90 tablet 3     No facility-administered medications prior to visit.        Patient Active Problem List   Diagnosis   • Chronic coronary artery disease   • Diverticulosis of sigmoid colon   • Hyperlipidemia   • Internal hemorrhoids   • Rosacea   • Thrombocytopenia (CMS/[facility])       Advance Care Planning:  has an advance directive - a copy has been provided and is in file    Identification of Risk Factors:  Risk factors include: cardiovascular risk.    Review of Systems    Compared  "to one year ago, the patient feels his physical health is the same.  Compared to one year ago, the patient feels his mental health is the same.    Objective     Physical Exam   Constitutional: He is oriented to person, place, and time. He appears well-developed and well-nourished.   HENT:   Head: Normocephalic.   Right Ear: External ear normal.   Left Ear: External ear normal.   Mouth/Throat: Oropharynx is clear and moist.   Eyes: Conjunctivae and EOM are normal. Pupils are equal, round, and reactive to light.   Neck: Normal range of motion. Neck supple. No tracheal deviation present. No thyromegaly present.   Cardiovascular: Normal rate, regular rhythm, normal heart sounds and intact distal pulses.   No murmur heard.  Pulmonary/Chest: Effort normal and breath sounds normal.   Abdominal: Soft. There is no tenderness.   Genitourinary: Rectum normal and prostate normal.   Musculoskeletal: He exhibits no edema.   Stable kyphosis   Lymphadenopathy:     He has no cervical adenopathy.   Neurological: He is alert and oriented to person, place, and time.   Skin: Skin is warm and dry.   Psychiatric: He has a normal mood and affect.   Vitals reviewed.      Vitals:    02/18/19 0900   BP: 133/86   BP Location: Right arm   Patient Position: Sitting   Cuff Size: Adult   Pulse: 64   Temp: 97.1 °F (36.2 °C)   TempSrc: Oral   Weight: 76.7 kg (169 lb)   Height: 177.8 cm (70\")       Patient's Body mass index is 24.25 kg/m². BMI is within normal parameters. No follow-up required..      Assessment/Plan   Patient Self-Management and Personalized Health Advice  The patient has been provided with information about: diet, exercise and prevention of cardiac or vascular disease and preventive services including:   · none.    Visit Diagnoses:    ICD-10-CM ICD-9-CM   1. Mixed hyperlipidemia E78.2 272.2   2. Medicare annual wellness visit, subsequent Z00.00 V70.0   3. Thrombocytopenia (CMS/HCC) D69.6 287.5       Orders Placed This Encounter "   Procedures   • Comprehensive Metabolic Panel     Standing Status:   Future     Standing Expiration Date:   2/18/2020   • Lipid Panel     Standing Status:   Future     Standing Expiration Date:   2/18/2020   • CBC & Differential     Standing Status:   Future     Standing Expiration Date:   2/18/2020     Order Specific Question:   Manual Differential     Answer:   No       Outpatient Encounter Medications as of 2/18/2019   Medication Sig Dispense Refill   • aspirin 81 MG tablet Take 81 mg by mouth daily.     • diphenhydrAMINE (BENADRYL) 25 MG tablet Take 50 mg by mouth at night as needed for itching or sleep.     • docusate sodium (COLACE) 250 MG capsule Take 250 mg by mouth Daily.     • metroNIDAZOLE (METROGEL) 0.75 % gel Apply  topically 2 (Two) Times a Day. 45 g 2   • Multiple Vitamin (MULTI VITAMIN MENS) tablet Take 1 tablet by mouth daily.     • simvastatin (ZOCOR) 20 MG tablet Take 1 tablet by mouth Every Night. 90 tablet 3     No facility-administered encounter medications on file as of 2/18/2019.        Reviewed use of high risk medication in the elderly: not applicable  Reviewed for potential of harmful drug interactions in the elderly: not applicable    Follow Up: Overall you're doing very well.  Today I reviewed recent metabolic parameters lipid CMP PSA unremarkable.  CBC stable with the mild depressed platelet count.  This time continue medications as reconciled ordered.  Stay safely active.  Maintain heart healthy diet.  You're current on PME.  Recheck in 6 months or as needed.  No Follow-up on file.     An After Visit Summary and PPPS with all of these plans were given to the patient.

## 2019-05-06 DIAGNOSIS — E78.2 MIXED HYPERLIPIDEMIA: ICD-10-CM

## 2019-05-06 RX ORDER — SIMVASTATIN 20 MG
TABLET ORAL
Qty: 90 TABLET | Refills: 0 | OUTPATIENT
Start: 2019-05-06

## 2019-05-06 RX ORDER — SIMVASTATIN 20 MG
20 TABLET ORAL NIGHTLY
Qty: 90 TABLET | Refills: 3 | Status: SHIPPED | OUTPATIENT
Start: 2019-05-06 | End: 2020-06-19 | Stop reason: SDUPTHER

## 2019-08-14 ENCOUNTER — LAB (OUTPATIENT)
Dept: FAMILY MEDICINE CLINIC | Facility: CLINIC | Age: 69
End: 2019-08-14

## 2019-08-14 DIAGNOSIS — E78.2 MIXED HYPERLIPIDEMIA: ICD-10-CM

## 2019-08-14 DIAGNOSIS — D69.6 THROMBOCYTOPENIA (HCC): ICD-10-CM

## 2019-08-14 PROCEDURE — 36415 COLL VENOUS BLD VENIPUNCTURE: CPT | Performed by: NURSE PRACTITIONER

## 2019-08-15 LAB
ALBUMIN SERPL-MCNC: 4.6 G/DL (ref 3.5–5.2)
ALBUMIN/GLOB SERPL: 2.2 G/DL
ALP SERPL-CCNC: 58 U/L (ref 39–117)
ALT SERPL-CCNC: 23 U/L (ref 1–41)
AST SERPL-CCNC: 25 U/L (ref 1–40)
BASOPHILS # BLD AUTO: (no result) 10*3/UL
BASOPHILS # BLD MANUAL: 0.05 10*3/MM3 (ref 0–0.2)
BASOPHILS NFR BLD MANUAL: 1 % (ref 0–1.5)
BILIRUB SERPL-MCNC: 0.5 MG/DL (ref 0.2–1.2)
BUN SERPL-MCNC: 16 MG/DL (ref 8–23)
BUN/CREAT SERPL: 18.8 (ref 7–25)
CALCIUM SERPL-MCNC: 9.2 MG/DL (ref 8.6–10.5)
CHLORIDE SERPL-SCNC: 105 MMOL/L (ref 98–107)
CHOLEST SERPL-MCNC: 144 MG/DL (ref 0–200)
CO2 SERPL-SCNC: 28.4 MMOL/L (ref 22–29)
CREAT SERPL-MCNC: 0.85 MG/DL (ref 0.76–1.27)
DIFFERENTIAL COMMENT: ABNORMAL
EOSINOPHIL # BLD AUTO: (no result) 10*3/UL
EOSINOPHIL # BLD MANUAL: 0.26 10*3/MM3 (ref 0–0.4)
EOSINOPHIL NFR BLD AUTO: (no result) %
EOSINOPHIL NFR BLD MANUAL: 5.2 % (ref 0.3–6.2)
ERYTHROCYTE [DISTWIDTH] IN BLOOD BY AUTOMATED COUNT: 12.3 % (ref 12.3–15.4)
GLOBULIN SER CALC-MCNC: 2.1 GM/DL
GLUCOSE SERPL-MCNC: 84 MG/DL (ref 65–99)
HCT VFR BLD AUTO: 45.1 % (ref 37.5–51)
HDLC SERPL-MCNC: 65 MG/DL (ref 40–60)
HGB BLD-MCNC: 14.1 G/DL (ref 13–17.7)
LDLC SERPL CALC-MCNC: 69 MG/DL (ref 0–100)
LYMPHOCYTES # BLD AUTO: (no result) 10*3/UL
LYMPHOCYTES # BLD MANUAL: 3.14 10*3/MM3 (ref 0.7–3.1)
LYMPHOCYTES NFR BLD AUTO: (no result) %
LYMPHOCYTES NFR BLD MANUAL: 61.9 % (ref 19.6–45.3)
MCH RBC QN AUTO: 31.9 PG (ref 26.6–33)
MCHC RBC AUTO-ENTMCNC: 31.3 G/DL (ref 31.5–35.7)
MCV RBC AUTO: 102 FL (ref 79–97)
MONOCYTES # BLD MANUAL: 0.52 10*3/MM3 (ref 0.1–0.9)
MONOCYTES NFR BLD AUTO: (no result) %
MONOCYTES NFR BLD MANUAL: 10.3 % (ref 5–12)
NEUTROPHILS # BLD MANUAL: 0.99 10*3/MM3 (ref 1.7–7)
NEUTROPHILS NFR BLD AUTO: (no result) %
NEUTROPHILS NFR BLD MANUAL: 19.6 % (ref 42.7–76)
PLATELET # BLD AUTO: 62 10*3/MM3 (ref 140–450)
PLATELET BLD QL SMEAR: ABNORMAL
POTASSIUM SERPL-SCNC: 4.7 MMOL/L (ref 3.5–5.2)
PROT SERPL-MCNC: 6.7 G/DL (ref 6–8.5)
RBC # BLD AUTO: 4.42 10*6/MM3 (ref 4.14–5.8)
RBC MORPH BLD: ABNORMAL
SODIUM SERPL-SCNC: 144 MMOL/L (ref 136–145)
TRIGL SERPL-MCNC: 49 MG/DL (ref 0–150)
VLDLC SERPL CALC-MCNC: 9.8 MG/DL
WBC # BLD AUTO: 5.07 10*3/MM3 (ref 3.4–10.8)

## 2019-08-19 ENCOUNTER — OFFICE VISIT (OUTPATIENT)
Dept: FAMILY MEDICINE CLINIC | Facility: CLINIC | Age: 69
End: 2019-08-19

## 2019-08-19 VITALS
HEIGHT: 70 IN | SYSTOLIC BLOOD PRESSURE: 138 MMHG | DIASTOLIC BLOOD PRESSURE: 84 MMHG | OXYGEN SATURATION: 99 % | WEIGHT: 166.8 LBS | BODY MASS INDEX: 23.88 KG/M2 | TEMPERATURE: 97.6 F | HEART RATE: 70 BPM

## 2019-08-19 DIAGNOSIS — Z12.5 SCREENING FOR PROSTATE CANCER: ICD-10-CM

## 2019-08-19 DIAGNOSIS — E78.2 MIXED HYPERLIPIDEMIA: Primary | ICD-10-CM

## 2019-08-19 DIAGNOSIS — D69.6 THROMBOCYTOPENIA (HCC): ICD-10-CM

## 2019-08-19 PROCEDURE — 99213 OFFICE O/P EST LOW 20 MIN: CPT | Performed by: FAMILY MEDICINE

## 2019-08-19 NOTE — PROGRESS NOTES
Subjective   Jem Crowder is a 69 y.o. male.     History of Present Illness follow-up regarding dyslipidemia.  Recent lab check.  Globally doing quite well.  Has had no recent acute illness.  Denies chest CDV GI  skin orthopedic concerns.  No bruising or bleeding noted.  Utilizing medications as reconciled.  Still with episodic unpredictable rare charley horses.  No myalgias.  Current on PME.  Has not had shingles vaccine or second hep A otherwise.    The following portions of the patient's history were reviewed and updated as appropriate: allergies, current medications, past medical history, past social history, past surgical history and problem list.    Review of Systems  See history of Present Illness     Objective     Physical Exam   Constitutional: He is oriented to person, place, and time. He appears well-developed and well-nourished.   HENT:   Head: Normocephalic.   Right Ear: External ear normal.   Left Ear: External ear normal.   Mouth/Throat: Oropharynx is clear and moist.   Eyes: Conjunctivae and EOM are normal. Pupils are equal, round, and reactive to light.   Neck: Normal range of motion. Neck supple. No tracheal deviation present. No thyromegaly present.   Cardiovascular: Normal rate, regular rhythm and normal heart sounds.   No murmur heard.  Pulmonary/Chest: Effort normal and breath sounds normal.   Musculoskeletal: He exhibits no edema.   Stable kyphosis   Neurological: He is alert and oriented to person, place, and time.   Skin: Skin is warm and dry.   Psychiatric: He has a normal mood and affect.   Vitals reviewed.      PHQ-9 Total Score:      Patient's Body mass index is 23.93 kg/m². BMI is within normal parameters. No follow-up required..   (Normal BMI:  18.5-24.9, OW 25-29.9, Obesity 30 or greater)      Assessment/Plan     Jem was seen today for hyperlipidemia.    Diagnoses and all orders for this visit:    Mixed hyperlipidemia  -     CBC & Differential; Future  -     Comprehensive  Metabolic Panel; Future  -     Lipid Panel; Future  -     TSH; Future    Thrombocytopenia (CMS/HCC)  -     CBC & Differential; Future    Screening for prostate cancer  -     PSA Screen; Future    We reviewed recent metabolic parameters.  All look great continue aggressive TLC stay safely active.  Consider some OTC magnesium to see if that helps with his muscle spasms.  Otherwise continue medications as reconciled ordered.  Recheck in about 6 months lab prior.  Flu vaccine sooner.  Consider Shingrix vaccine.  Get second hep A vaccine.  Maintain reasonable heart healthy diet.                     This document has been electronically signed by Marek Khanna MD   August 19, 2019 9:29 AM

## 2020-02-20 ENCOUNTER — LAB (OUTPATIENT)
Dept: FAMILY MEDICINE CLINIC | Facility: CLINIC | Age: 70
End: 2020-02-20

## 2020-02-20 DIAGNOSIS — E78.2 MIXED HYPERLIPIDEMIA: ICD-10-CM

## 2020-02-20 DIAGNOSIS — Z12.5 SCREENING FOR PROSTATE CANCER: ICD-10-CM

## 2020-02-20 DIAGNOSIS — D69.6 THROMBOCYTOPENIA (HCC): ICD-10-CM

## 2020-02-20 PROCEDURE — 36415 COLL VENOUS BLD VENIPUNCTURE: CPT | Performed by: NURSE PRACTITIONER

## 2020-02-21 LAB
ALBUMIN SERPL-MCNC: 4.8 G/DL (ref 3.5–5.2)
ALBUMIN/GLOB SERPL: 2.7 G/DL
ALP SERPL-CCNC: 46 U/L (ref 39–117)
ALT SERPL-CCNC: 28 U/L (ref 1–41)
AST SERPL-CCNC: 25 U/L (ref 1–40)
BASOPHILS # BLD AUTO: ABNORMAL 10*3/UL
BASOPHILS # BLD MANUAL: 0.1 10*3/MM3 (ref 0–0.2)
BASOPHILS NFR BLD MANUAL: 2 % (ref 0–1.5)
BILIRUB SERPL-MCNC: 0.6 MG/DL (ref 0.2–1.2)
BUN SERPL-MCNC: 19 MG/DL (ref 8–23)
BUN/CREAT SERPL: 21.3 (ref 7–25)
CALCIUM SERPL-MCNC: 9.7 MG/DL (ref 8.6–10.5)
CHLORIDE SERPL-SCNC: 102 MMOL/L (ref 98–107)
CHOLEST SERPL-MCNC: 152 MG/DL (ref 0–200)
CO2 SERPL-SCNC: 29 MMOL/L (ref 22–29)
CREAT SERPL-MCNC: 0.89 MG/DL (ref 0.76–1.27)
DIFFERENTIAL COMMENT: ABNORMAL
EOSINOPHIL # BLD AUTO: ABNORMAL 10*3/UL
EOSINOPHIL # BLD MANUAL: 0.41 10*3/MM3 (ref 0–0.4)
EOSINOPHIL NFR BLD AUTO: ABNORMAL %
EOSINOPHIL NFR BLD MANUAL: 8 % (ref 0.3–6.2)
ERYTHROCYTE [DISTWIDTH] IN BLOOD BY AUTOMATED COUNT: 11.9 % (ref 12.3–15.4)
GLOBULIN SER CALC-MCNC: 1.8 GM/DL
GLUCOSE SERPL-MCNC: 77 MG/DL (ref 65–99)
HCT VFR BLD AUTO: 42.1 % (ref 37.5–51)
HDLC SERPL-MCNC: 71 MG/DL (ref 40–60)
HGB BLD-MCNC: 14.1 G/DL (ref 13–17.7)
LDLC SERPL CALC-MCNC: 70 MG/DL (ref 0–100)
LYMPHOCYTES # BLD AUTO: ABNORMAL 10*3/UL
LYMPHOCYTES # BLD MANUAL: 2.99 10*3/MM3 (ref 0.7–3.1)
LYMPHOCYTES NFR BLD AUTO: ABNORMAL %
LYMPHOCYTES NFR BLD MANUAL: 59 % (ref 19.6–45.3)
MCH RBC QN AUTO: 32.1 PG (ref 26.6–33)
MCHC RBC AUTO-ENTMCNC: 33.5 G/DL (ref 31.5–35.7)
MCV RBC AUTO: 95.9 FL (ref 79–97)
MONOCYTES # BLD MANUAL: 0.3 10*3/MM3 (ref 0.1–0.9)
MONOCYTES NFR BLD AUTO: ABNORMAL %
MONOCYTES NFR BLD MANUAL: 6 % (ref 5–12)
NEUTROPHILS # BLD MANUAL: 1.27 10*3/MM3 (ref 1.7–7)
NEUTROPHILS NFR BLD AUTO: ABNORMAL %
NEUTROPHILS NFR BLD MANUAL: 25 % (ref 42.7–76)
PLATELET # BLD AUTO: 55 10*3/MM3 (ref 140–450)
PLATELET BLD QL SMEAR: ABNORMAL
POTASSIUM SERPL-SCNC: 4.9 MMOL/L (ref 3.5–5.2)
PROT SERPL-MCNC: 6.6 G/DL (ref 6–8.5)
PSA SERPL-MCNC: 1.17 NG/ML (ref 0–4)
RBC # BLD AUTO: 4.39 10*6/MM3 (ref 4.14–5.8)
RBC MORPH BLD: ABNORMAL
SODIUM SERPL-SCNC: 144 MMOL/L (ref 136–145)
TRIGL SERPL-MCNC: 54 MG/DL (ref 0–150)
TSH SERPL DL<=0.005 MIU/L-ACNC: 1.37 UIU/ML (ref 0.27–4.2)
VLDLC SERPL CALC-MCNC: 10.8 MG/DL (ref 5–40)
WBC # BLD AUTO: 5.07 10*3/MM3 (ref 3.4–10.8)

## 2020-02-24 ENCOUNTER — LAB (OUTPATIENT)
Dept: LAB | Facility: HOSPITAL | Age: 70
End: 2020-02-24

## 2020-02-24 ENCOUNTER — OFFICE VISIT (OUTPATIENT)
Dept: FAMILY MEDICINE CLINIC | Facility: CLINIC | Age: 70
End: 2020-02-24

## 2020-02-24 VITALS
HEIGHT: 70 IN | WEIGHT: 171.2 LBS | OXYGEN SATURATION: 95 % | TEMPERATURE: 97.7 F | BODY MASS INDEX: 24.51 KG/M2 | SYSTOLIC BLOOD PRESSURE: 126 MMHG | HEART RATE: 68 BPM | DIASTOLIC BLOOD PRESSURE: 74 MMHG

## 2020-02-24 DIAGNOSIS — D69.6 THROMBOCYTOPENIA (HCC): ICD-10-CM

## 2020-02-24 DIAGNOSIS — Z12.5 SCREENING FOR PROSTATE CANCER: ICD-10-CM

## 2020-02-24 DIAGNOSIS — E78.2 MIXED HYPERLIPIDEMIA: Primary | ICD-10-CM

## 2020-02-24 LAB
BASOPHILS # BLD AUTO: 0.02 10*3/MM3 (ref 0–0.2)
BASOPHILS NFR BLD AUTO: 0.4 % (ref 0–1.5)
DEPRECATED RDW RBC AUTO: 43.5 FL (ref 37–54)
EOSINOPHIL # BLD AUTO: 0.17 10*3/MM3 (ref 0–0.4)
EOSINOPHIL NFR BLD AUTO: 3.1 % (ref 0.3–6.2)
ERYTHROCYTE [DISTWIDTH] IN BLOOD BY AUTOMATED COUNT: 12.1 % (ref 12.3–15.4)
HCT VFR BLD AUTO: 43.8 % (ref 37.5–51)
HGB BLD-MCNC: 14.5 G/DL (ref 13–17.7)
IMM GRANULOCYTES # BLD AUTO: 0.01 10*3/MM3 (ref 0–0.05)
IMM GRANULOCYTES NFR BLD AUTO: 0.2 % (ref 0–0.5)
LYMPHOCYTES # BLD AUTO: 1.79 10*3/MM3 (ref 0.7–3.1)
LYMPHOCYTES NFR BLD AUTO: 32.8 % (ref 19.6–45.3)
MCH RBC QN AUTO: 32.4 PG (ref 26.6–33)
MCHC RBC AUTO-ENTMCNC: 33.1 G/DL (ref 31.5–35.7)
MCV RBC AUTO: 97.8 FL (ref 79–97)
MONOCYTES # BLD AUTO: 0.49 10*3/MM3 (ref 0.1–0.9)
MONOCYTES NFR BLD AUTO: 9 % (ref 5–12)
NEUTROPHILS # BLD AUTO: 2.98 10*3/MM3 (ref 1.7–7)
NEUTROPHILS NFR BLD AUTO: 54.5 % (ref 42.7–76)
NRBC BLD AUTO-RTO: 0 /100 WBC (ref 0–0.2)
PLATELET # BLD AUTO: 236 10*3/MM3 (ref 140–450)
PMV BLD AUTO: 9.4 FL (ref 6–12)
RBC # BLD AUTO: 4.48 10*6/MM3 (ref 4.14–5.8)
WBC NRBC COR # BLD: 5.46 10*3/MM3 (ref 3.4–10.8)

## 2020-02-24 PROCEDURE — 85025 COMPLETE CBC W/AUTO DIFF WBC: CPT

## 2020-02-24 PROCEDURE — 36415 COLL VENOUS BLD VENIPUNCTURE: CPT

## 2020-02-24 PROCEDURE — 99214 OFFICE O/P EST MOD 30 MIN: CPT | Performed by: FAMILY MEDICINE

## 2020-02-24 RX ORDER — POLYMYXIN B SULFATE AND TRIMETHOPRIM 1; 10000 MG/ML; [USP'U]/ML
SOLUTION OPHTHALMIC
COMMUNITY
Start: 2020-01-31 | End: 2020-08-24

## 2020-02-24 RX ORDER — PREDNISOLONE ACETATE 10 MG/ML
SUSPENSION/ DROPS OPHTHALMIC
COMMUNITY
Start: 2020-02-21 | End: 2020-08-24

## 2020-02-24 RX ORDER — TOBRAMYCIN 3 MG/ML
SOLUTION/ DROPS OPHTHALMIC
COMMUNITY
Start: 2020-01-31 | End: 2020-08-24

## 2020-02-24 NOTE — PROGRESS NOTES
Let him know that the platelet count was normal.  I believe we should have him do lab work at hospital routinely in future to accommodate this concern.

## 2020-02-24 NOTE — PROGRESS NOTES
Subjective   Jem Crowder is a 69 y.o. male.     History of Present Illness follow-up regarding dyslipidemia CAD general medical checkup.  Historical thrombocytopenia.  Overall having no significant new problems.  Maintaining reasonable activity diet.  Utilizing medicines as reconciled.  Has not been acutely ill.  Denies respiratory CDV GI  orthopedic new skin concerns.  Has had cataract surgery with significant positive results.    The following portions of the patient's history were reviewed and updated as appropriate: allergies, current medications, past medical history, past social history, past surgical history and problem list.    Review of Systems  See history of Present Illness     Objective     Physical Exam   Constitutional: He is oriented to person, place, and time. He appears well-developed and well-nourished.   HENT:   Head: Normocephalic and atraumatic.   Right Ear: External ear normal.   Left Ear: External ear normal.   Nose: Nose normal.   Mouth/Throat: Oropharynx is clear and moist.   Eyes: Pupils are equal, round, and reactive to light. Conjunctivae and EOM are normal. No scleral icterus.   Neck: Normal range of motion. Neck supple. No tracheal deviation present. No thyromegaly present.   Cardiovascular: Normal rate, regular rhythm, normal heart sounds and intact distal pulses.   No murmur heard.  Pulmonary/Chest: Effort normal and breath sounds normal.   Abdominal: Soft. Bowel sounds are normal. There is no tenderness.   Genitourinary: Rectum normal and prostate normal.   Musculoskeletal: Normal range of motion. He exhibits no edema.   Neurological: He is alert and oriented to person, place, and time. He has normal reflexes.   Skin: Skin is warm and dry. No rash noted.   Psychiatric: He has a normal mood and affect. Judgment normal.   Vitals reviewed.      PHQ-9 Total Score: 0    Patient's Body mass index is 24.56 kg/m². BMI is within normal parameters. No follow-up required..   (Normal BMI:   18.5-24.9, OW 25-29.9, Obesity 30 or greater)      Assessment/Plan     Jem was seen today for mixed hyperlipidemia.    Diagnoses and all orders for this visit:    Mixed hyperlipidemia  -     CBC & Differential; Future  -     Comprehensive Metabolic Panel; Future  -     Lipid Panel; Future    Thrombocytopenia (CMS/HCC)  -     CBC & Differential; Future  -     CBC & Differential; Future    Screening for prostate cancer    We reviewed recent lab check.  Parameters all very acceptable although the platelet count has decreased.  Exam is stable.  We will continue medications as reconciled ordered.  Will obtain today CBC with appropriate tube to more accurately define platelets.  Did follow with hematology a few years ago.  It was deemed to be fictitious.  Recheck here in about 6 months.  Otherwise will notify of results of course and further recommendations.  Stay safely active.  Maintain heart healthy diet.                     This document has been electronically signed by Marek Khanna MD   February 24, 2020 12:36 PM

## 2020-06-19 DIAGNOSIS — E78.2 MIXED HYPERLIPIDEMIA: ICD-10-CM

## 2020-06-19 RX ORDER — SIMVASTATIN 20 MG
20 TABLET ORAL NIGHTLY
Qty: 90 TABLET | Refills: 3 | Status: SHIPPED | OUTPATIENT
Start: 2020-06-19 | End: 2021-07-26

## 2020-06-19 NOTE — TELEPHONE ENCOUNTER
Pt requests zocor be sent to [facility], 90 days with refills.  Last seen 2/24/20 next appt 8/14/20  Last filled 5/16/19

## 2020-08-14 ENCOUNTER — LAB (OUTPATIENT)
Dept: FAMILY MEDICINE CLINIC | Facility: CLINIC | Age: 70
End: 2020-08-14

## 2020-08-14 DIAGNOSIS — D69.6 THROMBOCYTOPENIA (HCC): ICD-10-CM

## 2020-08-14 DIAGNOSIS — E78.2 MIXED HYPERLIPIDEMIA: ICD-10-CM

## 2020-08-14 PROCEDURE — 36415 COLL VENOUS BLD VENIPUNCTURE: CPT | Performed by: NURSE PRACTITIONER

## 2020-08-15 LAB
ALBUMIN SERPL-MCNC: 4.7 G/DL (ref 3.5–5.2)
ALBUMIN/GLOB SERPL: 2.9 G/DL
ALP SERPL-CCNC: 51 U/L (ref 39–117)
ALT SERPL-CCNC: 23 U/L (ref 1–41)
AST SERPL-CCNC: 26 U/L (ref 1–40)
BASOPHILS # BLD AUTO: ABNORMAL 10*3/UL
BASOPHILS # BLD MANUAL: 0.05 10*3/MM3 (ref 0–0.2)
BASOPHILS NFR BLD MANUAL: 1.1 % (ref 0–1.5)
BILIRUB SERPL-MCNC: 0.4 MG/DL (ref 0–1.2)
BUN SERPL-MCNC: 25 MG/DL (ref 8–23)
BUN/CREAT SERPL: 27.8 (ref 7–25)
CALCIUM SERPL-MCNC: 9.2 MG/DL (ref 8.6–10.5)
CHLORIDE SERPL-SCNC: 103 MMOL/L (ref 98–107)
CHOLEST SERPL-MCNC: 146 MG/DL (ref 0–200)
CO2 SERPL-SCNC: 29.9 MMOL/L (ref 22–29)
CREAT SERPL-MCNC: 0.9 MG/DL (ref 0.76–1.27)
DIFFERENTIAL COMMENT: ABNORMAL
EOSINOPHIL # BLD AUTO: ABNORMAL 10*3/UL
EOSINOPHIL # BLD MANUAL: 0.32 10*3/MM3 (ref 0–0.4)
EOSINOPHIL NFR BLD AUTO: ABNORMAL %
EOSINOPHIL NFR BLD MANUAL: 6.5 % (ref 0.3–6.2)
ERYTHROCYTE [DISTWIDTH] IN BLOOD BY AUTOMATED COUNT: 11.9 % (ref 12.3–15.4)
GLOBULIN SER CALC-MCNC: 1.6 GM/DL
GLUCOSE SERPL-MCNC: 86 MG/DL (ref 65–99)
HCT VFR BLD AUTO: 41.7 % (ref 37.5–51)
HDLC SERPL-MCNC: 66 MG/DL (ref 40–60)
HGB BLD-MCNC: 13.8 G/DL (ref 13–17.7)
LDLC SERPL CALC-MCNC: 71 MG/DL (ref 0–100)
LYMPHOCYTES # BLD AUTO: ABNORMAL 10*3/UL
LYMPHOCYTES # BLD MANUAL: 2.43 10*3/MM3 (ref 0.7–3.1)
LYMPHOCYTES NFR BLD AUTO: ABNORMAL %
LYMPHOCYTES NFR BLD MANUAL: 48.9 % (ref 19.6–45.3)
MCH RBC QN AUTO: 31.9 PG (ref 26.6–33)
MCHC RBC AUTO-ENTMCNC: 33.1 G/DL (ref 31.5–35.7)
MCV RBC AUTO: 96.3 FL (ref 79–97)
MONOCYTES # BLD MANUAL: 0.76 10*3/MM3 (ref 0.1–0.9)
MONOCYTES NFR BLD AUTO: ABNORMAL %
MONOCYTES NFR BLD MANUAL: 15.2 % (ref 5–12)
NEUTROPHILS # BLD MANUAL: 1.41 10*3/MM3 (ref 1.7–7)
NEUTROPHILS NFR BLD AUTO: ABNORMAL %
NEUTROPHILS NFR BLD MANUAL: 28.3 % (ref 42.7–76)
PLATELET # BLD AUTO: 64 10*3/MM3 (ref 140–450)
PLATELET BLD QL SMEAR: ABNORMAL
POTASSIUM SERPL-SCNC: 4.9 MMOL/L (ref 3.5–5.2)
PROT SERPL-MCNC: 6.3 G/DL (ref 6–8.5)
RBC # BLD AUTO: 4.33 10*6/MM3 (ref 4.14–5.8)
RBC MORPH BLD: ABNORMAL
SODIUM SERPL-SCNC: 140 MMOL/L (ref 136–145)
TRIGL SERPL-MCNC: 44 MG/DL (ref 0–150)
VLDLC SERPL CALC-MCNC: 8.8 MG/DL
WBC # BLD AUTO: 4.97 10*3/MM3 (ref 3.4–10.8)

## 2020-08-24 ENCOUNTER — OFFICE VISIT (OUTPATIENT)
Dept: FAMILY MEDICINE CLINIC | Facility: CLINIC | Age: 70
End: 2020-08-24

## 2020-08-24 VITALS
SYSTOLIC BLOOD PRESSURE: 134 MMHG | DIASTOLIC BLOOD PRESSURE: 78 MMHG | HEIGHT: 70 IN | WEIGHT: 168 LBS | RESPIRATION RATE: 16 BRPM | TEMPERATURE: 98.2 F | OXYGEN SATURATION: 99 % | BODY MASS INDEX: 24.05 KG/M2 | HEART RATE: 75 BPM

## 2020-08-24 DIAGNOSIS — Z12.5 SCREENING FOR PROSTATE CANCER: ICD-10-CM

## 2020-08-24 DIAGNOSIS — R93.5 ABNORMAL FINDINGS ON DIAGNOSTIC IMAGING OF OTHER ABDOMINAL REGIONS, INCLUDING RETROPERITONEUM: ICD-10-CM

## 2020-08-24 DIAGNOSIS — L71.9 ROSACEA: ICD-10-CM

## 2020-08-24 DIAGNOSIS — E78.2 MIXED HYPERLIPIDEMIA: Primary | ICD-10-CM

## 2020-08-24 DIAGNOSIS — Z00.00 MEDICARE ANNUAL WELLNESS VISIT, SUBSEQUENT: ICD-10-CM

## 2020-08-24 PROCEDURE — G0439 PPPS, SUBSEQ VISIT: HCPCS | Performed by: FAMILY MEDICINE

## 2020-08-24 PROCEDURE — 96160 PT-FOCUSED HLTH RISK ASSMT: CPT | Performed by: FAMILY MEDICINE

## 2020-08-24 RX ORDER — METRONIDAZOLE 7.5 MG/G
GEL TOPICAL 2 TIMES DAILY
Qty: 45 G | Refills: 2 | Status: SHIPPED | OUTPATIENT
Start: 2020-08-24 | End: 2022-07-26 | Stop reason: SDUPTHER

## 2020-08-29 ENCOUNTER — RESULTS ENCOUNTER (OUTPATIENT)
Dept: FAMILY MEDICINE CLINIC | Facility: CLINIC | Age: 70
End: 2020-08-29

## 2020-08-29 DIAGNOSIS — Z12.5 SCREENING FOR PROSTATE CANCER: ICD-10-CM

## 2020-08-29 DIAGNOSIS — E78.2 MIXED HYPERLIPIDEMIA: ICD-10-CM

## 2020-10-01 ENCOUNTER — FLU SHOT (OUTPATIENT)
Dept: FAMILY MEDICINE CLINIC | Facility: CLINIC | Age: 70
End: 2020-10-01

## 2020-10-01 DIAGNOSIS — Z23 NEED FOR INFLUENZA VACCINATION: Primary | ICD-10-CM

## 2020-10-01 PROCEDURE — 90694 VACC AIIV4 NO PRSRV 0.5ML IM: CPT | Performed by: FAMILY MEDICINE

## 2020-10-01 PROCEDURE — G0008 ADMIN INFLUENZA VIRUS VAC: HCPCS | Performed by: FAMILY MEDICINE

## 2021-01-19 ENCOUNTER — TELEPHONE (OUTPATIENT)
Dept: FAMILY MEDICINE CLINIC | Facility: CLINIC | Age: 71
End: 2021-01-19

## 2021-02-24 ENCOUNTER — LAB (OUTPATIENT)
Dept: LAB | Facility: HOSPITAL | Age: 71
End: 2021-02-24

## 2021-02-24 LAB
ALBUMIN SERPL-MCNC: 4.79 G/DL (ref 3.5–5.2)
ALBUMIN/GLOB SERPL: 2.1 G/DL
ALP SERPL-CCNC: 58 U/L (ref 39–117)
ALT SERPL W P-5'-P-CCNC: 35 U/L (ref 1–41)
ANION GAP SERPL CALCULATED.3IONS-SCNC: 6.1 MMOL/L (ref 5–15)
AST SERPL-CCNC: 34 U/L (ref 1–40)
BASOPHILS # BLD AUTO: 0.03 10*3/MM3 (ref 0–0.2)
BASOPHILS NFR BLD AUTO: 0.6 % (ref 0–1.5)
BILIRUB SERPL-MCNC: 0.4 MG/DL (ref 0–1.2)
BUN SERPL-MCNC: 20 MG/DL (ref 8–23)
BUN/CREAT SERPL: 20.8 (ref 7–25)
CALCIUM SPEC-SCNC: 9.8 MG/DL (ref 8.6–10.5)
CHLORIDE SERPL-SCNC: 104 MMOL/L (ref 98–107)
CHOLEST SERPL-MCNC: 153 MG/DL (ref 0–200)
CO2 SERPL-SCNC: 28.9 MMOL/L (ref 22–29)
CREAT SERPL-MCNC: 0.96 MG/DL (ref 0.76–1.27)
DEPRECATED RDW RBC AUTO: 46.9 FL (ref 37–54)
EOSINOPHIL # BLD AUTO: 0.21 10*3/MM3 (ref 0–0.4)
EOSINOPHIL NFR BLD AUTO: 4 % (ref 0.3–6.2)
ERYTHROCYTE [DISTWIDTH] IN BLOOD BY AUTOMATED COUNT: 12.7 % (ref 12.3–15.4)
GFR SERPL CREATININE-BSD FRML MDRD: 78 ML/MIN/1.73
GLOBULIN UR ELPH-MCNC: 2.3 GM/DL
GLUCOSE SERPL-MCNC: 88 MG/DL (ref 65–99)
HCT VFR BLD AUTO: 43.8 % (ref 37.5–51)
HDLC SERPL-MCNC: 71 MG/DL (ref 40–60)
HGB BLD-MCNC: 13.9 G/DL (ref 13–17.7)
IMM GRANULOCYTES # BLD AUTO: 0.01 10*3/MM3 (ref 0–0.05)
IMM GRANULOCYTES NFR BLD AUTO: 0.2 % (ref 0–0.5)
LDLC SERPL CALC-MCNC: 70 MG/DL (ref 0–100)
LDLC/HDLC SERPL: 1 {RATIO}
LYMPHOCYTES # BLD AUTO: 1.57 10*3/MM3 (ref 0.7–3.1)
LYMPHOCYTES NFR BLD AUTO: 29.7 % (ref 19.6–45.3)
MCH RBC QN AUTO: 31.8 PG (ref 26.6–33)
MCHC RBC AUTO-ENTMCNC: 31.7 G/DL (ref 31.5–35.7)
MCV RBC AUTO: 100.2 FL (ref 79–97)
MONOCYTES # BLD AUTO: 0.35 10*3/MM3 (ref 0.1–0.9)
MONOCYTES NFR BLD AUTO: 6.6 % (ref 5–12)
NEUTROPHILS NFR BLD AUTO: 3.11 10*3/MM3 (ref 1.7–7)
NEUTROPHILS NFR BLD AUTO: 58.9 % (ref 42.7–76)
NRBC BLD AUTO-RTO: 0 /100 WBC (ref 0–0.2)
PLATELET # BLD AUTO: 101 10*3/MM3 (ref 140–450)
PMV BLD AUTO: 12.4 FL (ref 6–12)
POTASSIUM SERPL-SCNC: 4.7 MMOL/L (ref 3.5–5.2)
PROT SERPL-MCNC: 7.1 G/DL (ref 6–8.5)
PSA SERPL-MCNC: 1.24 NG/ML (ref 0–4)
RBC # BLD AUTO: 4.37 10*6/MM3 (ref 4.14–5.8)
SODIUM SERPL-SCNC: 139 MMOL/L (ref 136–145)
TRIGL SERPL-MCNC: 56 MG/DL (ref 0–150)
VLDLC SERPL-MCNC: 12 MG/DL (ref 5–40)
WBC # BLD AUTO: 5.28 10*3/MM3 (ref 3.4–10.8)

## 2021-02-24 PROCEDURE — 36415 COLL VENOUS BLD VENIPUNCTURE: CPT | Performed by: FAMILY MEDICINE

## 2021-02-24 PROCEDURE — 85025 COMPLETE CBC W/AUTO DIFF WBC: CPT | Performed by: FAMILY MEDICINE

## 2021-02-24 PROCEDURE — 80053 COMPREHEN METABOLIC PANEL: CPT | Performed by: FAMILY MEDICINE

## 2021-02-24 PROCEDURE — 80061 LIPID PANEL: CPT | Performed by: FAMILY MEDICINE

## 2021-02-24 PROCEDURE — 84153 ASSAY OF PSA TOTAL: CPT | Performed by: FAMILY MEDICINE

## 2021-03-02 ENCOUNTER — OFFICE VISIT (OUTPATIENT)
Dept: FAMILY MEDICINE CLINIC | Facility: CLINIC | Age: 71
End: 2021-03-02

## 2021-03-02 VITALS
OXYGEN SATURATION: 98 % | SYSTOLIC BLOOD PRESSURE: 138 MMHG | HEIGHT: 70 IN | WEIGHT: 176 LBS | RESPIRATION RATE: 16 BRPM | DIASTOLIC BLOOD PRESSURE: 80 MMHG | BODY MASS INDEX: 25.2 KG/M2 | TEMPERATURE: 97.3 F | HEART RATE: 70 BPM

## 2021-03-02 DIAGNOSIS — D69.6 THROMBOCYTOPENIA (HCC): ICD-10-CM

## 2021-03-02 DIAGNOSIS — Z12.5 SCREENING FOR PROSTATE CANCER: ICD-10-CM

## 2021-03-02 DIAGNOSIS — E78.2 MIXED HYPERLIPIDEMIA: Primary | ICD-10-CM

## 2021-03-02 DIAGNOSIS — L71.9 ROSACEA: ICD-10-CM

## 2021-03-02 PROCEDURE — 99214 OFFICE O/P EST MOD 30 MIN: CPT | Performed by: FAMILY MEDICINE

## 2021-03-02 NOTE — PROGRESS NOTES
Subjective   Jem Crowder is a 70 y.o. male.     History of Present Illness follow-up regarding dyslipidemia thrombocytopenia CAD.  Medication management.  Globally doing quite well.  Trying to stay physically active.  Maintaining pandemic response.  Has not had vaccine yet.  Denies respiratory CDV GI  skin orthopedic changes.  Maintaining reasonable diet.  Current on desired PME.    The following portions of the patient's history were reviewed and updated as appropriate: allergies, current medications, past medical history, past social history, past surgical history and problem list.    Review of Systems  See history of Present Illness     Objective     Physical Exam  Vitals signs reviewed.   Constitutional:       Appearance: Normal appearance. He is well-developed.   HENT:      Head: Normocephalic.      Right Ear: External ear normal.      Left Ear: External ear normal.   Eyes:      Conjunctiva/sclera: Conjunctivae normal.      Pupils: Pupils are equal, round, and reactive to light.   Neck:      Musculoskeletal: Normal range of motion and neck supple.      Thyroid: No thyromegaly.      Vascular: No carotid bruit.      Trachea: No tracheal deviation.   Cardiovascular:      Rate and Rhythm: Normal rate and regular rhythm.      Heart sounds: Normal heart sounds. No murmur.   Pulmonary:      Effort: Pulmonary effort is normal.      Breath sounds: Normal breath sounds.   Abdominal:      Palpations: Abdomen is soft.      Tenderness: There is no abdominal tenderness.   Genitourinary:     Prostate: Normal.      Rectum: Normal.   Musculoskeletal: Normal range of motion.   Lymphadenopathy:      Cervical: No cervical adenopathy.   Skin:     General: Skin is warm and dry.   Neurological:      Mental Status: He is alert and oriented to person, place, and time.   Psychiatric:         Mood and Affect: Mood normal.         PHQ-9 Total Score:      Patient's There is no height or weight on file to calculate BMI. BMI is above  normal parameters. Recommendations include: exercise counseling and nutrition counseling.   (Normal BMI:  18.5-24.9, OW 25-29.9, Obesity 30 or greater)      Assessment/Plan     Diagnoses and all orders for this visit:    1. Mixed hyperlipidemia (Primary)  -     Comprehensive Metabolic Panel; Future  -     Lipid Panel; Future    2. Rosacea    3. Thrombocytopenia (CMS/HCC)  -     CBC & Differential; Future    4. Screening for prostate cancer    Overall you seem to be doing quite well.  Exam is unremarkable.  We reviewed recent metabolic hematologic parameters.  All very stable.  Encourage you to stay safely active maintain heart healthy diet.  Maintain pandemic response.  Get Covid vaccine when available to you.  We will ask you to recheck in about 6 months or as needed.                     This document has been electronically signed by Marek Khanna MD   March 2, 2021 09:12 EST    Part of this note may be an electronic transcription/translation of spoken language to printed text using the Dragon Dictation System.

## 2021-07-26 DIAGNOSIS — E78.2 MIXED HYPERLIPIDEMIA: ICD-10-CM

## 2021-07-26 RX ORDER — SIMVASTATIN 20 MG
20 TABLET ORAL NIGHTLY
Qty: 90 TABLET | Refills: 3 | Status: SHIPPED | OUTPATIENT
Start: 2021-07-26 | End: 2022-07-26 | Stop reason: SDUPTHER

## 2021-09-03 ENCOUNTER — LAB (OUTPATIENT)
Dept: LAB | Facility: HOSPITAL | Age: 71
End: 2021-09-03

## 2021-09-03 DIAGNOSIS — D69.6 THROMBOCYTOPENIA (HCC): ICD-10-CM

## 2021-09-03 LAB
BASOPHILS # BLD AUTO: 0.02 10*3/MM3 (ref 0–0.2)
BASOPHILS NFR BLD AUTO: 0.4 % (ref 0–1.5)
DEPRECATED RDW RBC AUTO: 43.8 FL (ref 37–54)
EOSINOPHIL # BLD AUTO: 0.23 10*3/MM3 (ref 0–0.4)
EOSINOPHIL NFR BLD AUTO: 4.5 % (ref 0.3–6.2)
ERYTHROCYTE [DISTWIDTH] IN BLOOD BY AUTOMATED COUNT: 12.4 % (ref 12.3–15.4)
HCT VFR BLD AUTO: 42.7 % (ref 37.5–51)
HGB BLD-MCNC: 13.8 G/DL (ref 13–17.7)
IMM GRANULOCYTES # BLD AUTO: 0.01 10*3/MM3 (ref 0–0.05)
IMM GRANULOCYTES NFR BLD AUTO: 0.2 % (ref 0–0.5)
LYMPHOCYTES # BLD AUTO: 1.43 10*3/MM3 (ref 0.7–3.1)
LYMPHOCYTES NFR BLD AUTO: 28.1 % (ref 19.6–45.3)
MCH RBC QN AUTO: 31 PG (ref 26.6–33)
MCHC RBC AUTO-ENTMCNC: 32.3 G/DL (ref 31.5–35.7)
MCV RBC AUTO: 96 FL (ref 79–97)
MONOCYTES # BLD AUTO: 0.42 10*3/MM3 (ref 0.1–0.9)
MONOCYTES NFR BLD AUTO: 8.3 % (ref 5–12)
NEUTROPHILS NFR BLD AUTO: 2.97 10*3/MM3 (ref 1.7–7)
NEUTROPHILS NFR BLD AUTO: 58.5 % (ref 42.7–76)
NRBC BLD AUTO-RTO: 0 /100 WBC (ref 0–0.2)
PLATELET # BLD AUTO: 208 10*3/MM3 (ref 140–450)
PMV BLD AUTO: 10.1 FL (ref 6–12)
RBC # BLD AUTO: 4.45 10*6/MM3 (ref 4.14–5.8)
WBC # BLD AUTO: 5.08 10*3/MM3 (ref 3.4–10.8)

## 2021-09-03 PROCEDURE — 85025 COMPLETE CBC W/AUTO DIFF WBC: CPT

## 2021-09-13 ENCOUNTER — OFFICE VISIT (OUTPATIENT)
Dept: FAMILY MEDICINE CLINIC | Facility: CLINIC | Age: 71
End: 2021-09-13

## 2021-09-13 VITALS
TEMPERATURE: 98.2 F | SYSTOLIC BLOOD PRESSURE: 138 MMHG | HEART RATE: 76 BPM | WEIGHT: 169 LBS | DIASTOLIC BLOOD PRESSURE: 82 MMHG | HEIGHT: 70 IN | OXYGEN SATURATION: 100 % | BODY MASS INDEX: 24.2 KG/M2

## 2021-09-13 DIAGNOSIS — D69.6 THROMBOCYTOPENIA (HCC): Chronic | ICD-10-CM

## 2021-09-13 DIAGNOSIS — E78.2 MIXED HYPERLIPIDEMIA: Primary | Chronic | ICD-10-CM

## 2021-09-13 DIAGNOSIS — Z12.5 SCREENING FOR PROSTATE CANCER: ICD-10-CM

## 2021-09-13 DIAGNOSIS — Z00.00 MEDICARE ANNUAL WELLNESS VISIT, SUBSEQUENT: ICD-10-CM

## 2021-09-13 PROCEDURE — 1170F FXNL STATUS ASSESSED: CPT | Performed by: FAMILY MEDICINE

## 2021-09-13 PROCEDURE — 1160F RVW MEDS BY RX/DR IN RCRD: CPT | Performed by: FAMILY MEDICINE

## 2021-09-13 PROCEDURE — 96160 PT-FOCUSED HLTH RISK ASSMT: CPT | Performed by: FAMILY MEDICINE

## 2021-09-13 PROCEDURE — 36415 COLL VENOUS BLD VENIPUNCTURE: CPT | Performed by: FAMILY MEDICINE

## 2021-09-13 PROCEDURE — G0439 PPPS, SUBSEQ VISIT: HCPCS | Performed by: FAMILY MEDICINE

## 2021-09-13 NOTE — PROGRESS NOTES
The ABCs of the Annual Wellness Visit  Subsequent Medicare Wellness Visit    Chief Complaint   Patient presents with   • Medicare Wellness-subsequent   • mixed hyperlipidemia      Subjective    History of Present Illness:  Jem Crowder is a 71 y.o. male who presents for a Subsequent Medicare Wellness Visit.  Follow-up dyslipidemia thrombocytopenia.  No new problems.  The skin condition stable.  Medications are as reconciled.  Feels best that has been quite a while.  Very compliant with diet activity medications.  Current on all recommended PME.  Maintaining pandemic response.  Denies respiratory CDV GI  orthopedic changes.    The following portions of the patient's history were reviewed and   updated as appropriate: allergies, current medications, past family history, past social history, past surgical history and problem list.    Compared to one year ago, the patient feels his physical   health is better.    Compared to one year ago, the patient feels his mental   health is the same.    Recent Hospitalizations:  He was not admitted to the hospital during the last year.       Current Medical Providers:  Patient Care Team:  Marek Khanna MD as PCP - General  Pat Hooks MD as Consulting Physician (Hematology and Oncology)    Outpatient Medications Prior to Visit   Medication Sig Dispense Refill   • aspirin 81 MG tablet Take 81 mg by mouth daily.     • docusate sodium (COLACE) 250 MG capsule Take 250 mg by mouth Daily.     • Magnesium 100 MG capsule Take 50 mg by mouth.     • metroNIDAZOLE (METROGEL) 0.75 % gel Apply  topically to the appropriate area as directed 2 (Two) Times a Day. 45 g 2   • Multiple Vitamin (MULTI VITAMIN MENS) tablet Take 1 tablet by mouth daily.     • simvastatin (ZOCOR) 20 MG tablet TAKE 1 TABLET BY MOUTH EVERY NIGHT. 90 tablet 3   • diphenhydrAMINE (BENADRYL) 25 MG tablet Take 50 mg by mouth at night as needed for itching or sleep.       No facility-administered medications  "prior to visit.       No opioid medication identified on active medication list. I have reviewed chart for other potential  high risk medication/s and harmful drug interactions in the elderly.          Aspirin is on active medication list. Aspirin use is indicated based on review of current medical condition/s. Pros and cons of this therapy have been discussed today. Benefits of this medication outweigh potential harm.  Patient has been encouraged to continue taking this medication.  .      Patient Active Problem List   Diagnosis   • Chronic coronary artery disease   • Diverticulosis of sigmoid colon   • Hyperlipidemia   • Internal hemorrhoids   • Rosacea   • Thrombocytopenia (CMS/[facility])     Advance Care Planning  Advance Directive is on file.  ACP discussion was held with the patient during this visit. Patient has an advance directive in EMR which is still valid.           Objective    Vitals:    09/13/21 0940   BP: 138/82   BP Location: Left arm   Patient Position: Sitting   Cuff Size: Adult   Pulse: 76   Temp: 98.2 °F (36.8 °C)   TempSrc: Temporal   SpO2: 100%   Weight: 76.7 kg (169 lb)   Height: 177.8 cm (70\")     BMI Readings from Last 1 Encounters:   09/13/21 24.25 kg/m²   BMI is within normal parameters. No follow-up required.    Does the patient have evidence of cognitive impairment? No    Physical Exam  Vitals reviewed.   Constitutional:       Appearance: Normal appearance.   HENT:      Head: Normocephalic.   Eyes:      Conjunctiva/sclera: Conjunctivae normal.   Cardiovascular:      Rate and Rhythm: Normal rate and regular rhythm.      Heart sounds: Normal heart sounds.   Pulmonary:      Effort: Pulmonary effort is normal.      Breath sounds: Normal breath sounds.   Musculoskeletal:      Cervical back: Normal range of motion and neck supple.   Skin:     General: Skin is warm and dry.   Neurological:      Mental Status: He is alert and oriented to person, place, and time.   Psychiatric:         Mood and " Affect: Mood normal.                 HEALTH RISK ASSESSMENT    Smoking Status:  Social History     Tobacco Use   Smoking Status Never Smoker   Smokeless Tobacco Former User   • Types: Chew     Alcohol Consumption:  Social History     Substance and Sexual Activity   Alcohol Use No     Fall Risk Screen:    [place] Fall Risk Assessment was completed, and patient is at LOW risk for falls.Assessment completed on:9/13/2021    Depression Screening:  PHQ-2/PHQ-9 Depression Screening 9/13/2021   Little interest or pleasure in doing things 0   Feeling down, depressed, or hopeless 0   Trouble falling or staying asleep, or sleeping too much -   Feeling tired or having little energy -   Poor appetite or overeating -   Feeling bad about yourself - or that you are a failure or have let yourself or your family down -   Trouble concentrating on things, such as reading the newspaper or watching television -   Moving or speaking so slowly that other people could have noticed. Or the opposite - being so fidgety or restless that you have been moving around a lot more than usual -   Thoughts that you would be better off dead, or of hurting yourself in some way -   Total Score 0       Health Habits and Functional and Cognitive Screening:  Functional & Cognitive Status 9/13/2021   Do you have difficulty preparing food and eating? No   Do you have difficulty bathing yourself, getting dressed or grooming yourself? No   Do you have difficulty using the toilet? No   Do you have difficulty moving around from place to place? No   Do you have trouble with steps or getting out of a bed or a chair? No   Current Diet Well Balanced Diet   Dental Exam Up to date   Eye Exam Up to date   Exercise (times per week) 7 times per week   Current Exercises Include Walking   Current Exercise Activities Include -   Do you need help using the phone?  No   Are you deaf or do you have serious difficulty hearing?  No   Do you need help with transportation? No   Do  you need help shopping? No   Do you need help preparing meals?  No   Do you need help with housework?  No   Do you need help with laundry? No   Do you need help taking your medications? No   Do you need help managing money? No   Do you ever drive or ride in a car without wearing a seat belt? No   Have you felt unusual stress, anger or loneliness in the last month? No   Who do you live with? Alone   If you need help, do you have trouble finding someone available to you? No   Have you been bothered in the last four weeks by sexual problems? No   Do you have difficulty concentrating, remembering or making decisions? No       Age-appropriate Screening Schedule:  Refer to the list below for future screening recommendations based on patient's age, sex and/or medical conditions. Orders for these recommended tests are listed in the plan section. The patient has been provided with a written plan.    Health Maintenance   Topic Date Due   • INFLUENZA VACCINE  10/01/2021   • LIPID PANEL  02/24/2022   • TDAP/TD VACCINES (4 - Td or Tdap) 10/08/2029   • ZOSTER VACCINE  Completed              Assessment/Plan   CMS Preventative Services Quick Reference  Risk Factors Identified During Encounter  Cardiovascular Disease  Immunizations Discussed/Encouraged (specific Immunizations; Influenza and COVID19  The above risks/problems have been discussed with the patient.  Follow up actions/plans if indicated are seen below in the Assessment/Plan Section.  Pertinent information has been shared with the patient in the After Visit Summary.    Diagnoses and all orders for this visit:    1. Mixed hyperlipidemia (Primary)  -     Comprehensive Metabolic Panel; Future  -     Lipid Panel; Future    2. Medicare annual wellness visit, subsequent    3. Thrombocytopenia (CMS/HCC)  -     CBC & Differential; Future    4. Screening for prostate cancer  -     PSA Screen; Future    CBC normal.  Will obtain the lipid and CMP as prior ordered.  Stay safely  active maintain pandemic response maintain medications same.  Flu vaccine in near future.  Covid booster at appropriate time.  We will notify you of all results.    Follow Up:   Return in about 6 months (around 3/13/2022).     An After Visit Summary and PPPS were made available to the patient.

## 2021-09-14 LAB
ALBUMIN SERPL-MCNC: 4.7 G/DL (ref 3.5–5.2)
ALBUMIN/GLOB SERPL: 2.5 G/DL
ALP SERPL-CCNC: 54 U/L (ref 39–117)
ALT SERPL-CCNC: 24 U/L (ref 1–41)
AST SERPL-CCNC: 28 U/L (ref 1–40)
BILIRUB SERPL-MCNC: 0.4 MG/DL (ref 0–1.2)
BUN SERPL-MCNC: 18 MG/DL (ref 8–23)
BUN/CREAT SERPL: 18.8 (ref 7–25)
CALCIUM SERPL-MCNC: 9.5 MG/DL (ref 8.6–10.5)
CHLORIDE SERPL-SCNC: 104 MMOL/L (ref 98–107)
CHOLEST SERPL-MCNC: 156 MG/DL (ref 0–200)
CO2 SERPL-SCNC: 27.5 MMOL/L (ref 22–29)
CREAT SERPL-MCNC: 0.96 MG/DL (ref 0.76–1.27)
GLOBULIN SER CALC-MCNC: 1.9 GM/DL
GLUCOSE SERPL-MCNC: 79 MG/DL (ref 65–99)
HDLC SERPL-MCNC: 67 MG/DL (ref 40–60)
LDLC SERPL CALC-MCNC: 75 MG/DL (ref 0–100)
POTASSIUM SERPL-SCNC: 4.6 MMOL/L (ref 3.5–5.2)
PROT SERPL-MCNC: 6.6 G/DL (ref 6–8.5)
SODIUM SERPL-SCNC: 142 MMOL/L (ref 136–145)
TRIGL SERPL-MCNC: 69 MG/DL (ref 0–150)
VLDLC SERPL CALC-MCNC: 14 MG/DL (ref 5–40)

## 2021-10-13 ENCOUNTER — FLU SHOT (OUTPATIENT)
Dept: FAMILY MEDICINE CLINIC | Facility: CLINIC | Age: 71
End: 2021-10-13

## 2021-10-13 DIAGNOSIS — Z23 NEED FOR INFLUENZA VACCINATION: Primary | ICD-10-CM

## 2021-10-13 PROCEDURE — G0008 ADMIN INFLUENZA VIRUS VAC: HCPCS | Performed by: NURSE PRACTITIONER

## 2021-10-13 PROCEDURE — 90662 IIV NO PRSV INCREASED AG IM: CPT | Performed by: NURSE PRACTITIONER

## 2022-03-09 ENCOUNTER — CLINICAL SUPPORT (OUTPATIENT)
Dept: FAMILY MEDICINE CLINIC | Facility: CLINIC | Age: 72
End: 2022-03-09

## 2022-03-09 DIAGNOSIS — E78.2 MIXED HYPERLIPIDEMIA: Primary | ICD-10-CM

## 2022-03-09 DIAGNOSIS — Z12.5 SCREENING FOR PROSTATE CANCER: ICD-10-CM

## 2022-03-09 DIAGNOSIS — I25.10 CHRONIC CORONARY ARTERY DISEASE: ICD-10-CM

## 2022-03-09 PROCEDURE — 36415 COLL VENOUS BLD VENIPUNCTURE: CPT | Performed by: INTERNAL MEDICINE

## 2022-03-09 NOTE — PROGRESS NOTES
Venipuncture Blood Specimen Collection  Venipuncture performed in left arm by Jackie Calvo MA with good hemostasis. Patient tolerated the procedure well without complications.   03/09/22   Jackie Calvo MA

## 2022-03-10 LAB
ALBUMIN SERPL-MCNC: 4.5 G/DL (ref 3.5–5.2)
ALBUMIN/GLOB SERPL: 2 G/DL
ALP SERPL-CCNC: 57 U/L (ref 39–117)
ALT SERPL-CCNC: 29 U/L (ref 1–41)
AST SERPL-CCNC: 24 U/L (ref 1–40)
BASOPHILS # BLD AUTO: ABNORMAL 10*3/UL
BILIRUB SERPL-MCNC: 0.5 MG/DL (ref 0–1.2)
BUN SERPL-MCNC: 19 MG/DL (ref 8–23)
BUN/CREAT SERPL: 20.9 (ref 7–25)
CALCIUM SERPL-MCNC: 9.4 MG/DL (ref 8.6–10.5)
CHLORIDE SERPL-SCNC: 105 MMOL/L (ref 98–107)
CHOLEST SERPL-MCNC: 154 MG/DL (ref 0–200)
CO2 SERPL-SCNC: 28.1 MMOL/L (ref 22–29)
CREAT SERPL-MCNC: 0.91 MG/DL (ref 0.76–1.27)
DIFFERENTIAL COMMENT: ABNORMAL
EOSINOPHIL # BLD AUTO: ABNORMAL 10*3/UL
EOSINOPHIL # BLD MANUAL: 0.11 10*3/MM3 (ref 0–0.4)
EOSINOPHIL NFR BLD AUTO: ABNORMAL %
EOSINOPHIL NFR BLD MANUAL: 2.1 % (ref 0.3–6.2)
ERYTHROCYTE [DISTWIDTH] IN BLOOD BY AUTOMATED COUNT: 12.7 % (ref 12.3–15.4)
GLOBULIN SER CALC-MCNC: 2.3 GM/DL
GLUCOSE SERPL-MCNC: 83 MG/DL (ref 65–99)
HCT VFR BLD AUTO: 40.9 % (ref 37.5–51)
HDLC SERPL-MCNC: 68 MG/DL (ref 40–60)
HGB BLD-MCNC: 13.5 G/DL (ref 13–17.7)
LDLC SERPL CALC-MCNC: 74 MG/DL (ref 0–100)
LYMPHOCYTES # BLD AUTO: ABNORMAL 10*3/UL
LYMPHOCYTES # BLD MANUAL: 2.73 10*3/MM3 (ref 0.7–3.1)
LYMPHOCYTES NFR BLD AUTO: ABNORMAL %
LYMPHOCYTES NFR BLD MANUAL: 51 % (ref 19.6–45.3)
MCH RBC QN AUTO: 32.1 PG (ref 26.6–33)
MCHC RBC AUTO-ENTMCNC: 33 G/DL (ref 31.5–35.7)
MCV RBC AUTO: 97.1 FL (ref 79–97)
MONOCYTES # BLD MANUAL: 0.62 10*3/MM3 (ref 0.1–0.9)
MONOCYTES NFR BLD AUTO: ABNORMAL %
MONOCYTES NFR BLD MANUAL: 11.5 % (ref 5–12)
NEUTROPHILS # BLD MANUAL: 1.9 10*3/MM3 (ref 1.7–7)
NEUTROPHILS NFR BLD AUTO: ABNORMAL %
NEUTROPHILS NFR BLD MANUAL: 35.4 % (ref 42.7–76)
PLATELET # BLD AUTO: 117 10*3/MM3 (ref 140–450)
PLATELET BLD QL SMEAR: ABNORMAL
POTASSIUM SERPL-SCNC: 4.2 MMOL/L (ref 3.5–5.2)
PROT SERPL-MCNC: 6.8 G/DL (ref 6–8.5)
PSA SERPL-MCNC: 1.29 NG/ML (ref 0–4)
RBC # BLD AUTO: 4.21 10*6/MM3 (ref 4.14–5.8)
RBC MORPH BLD: ABNORMAL
SODIUM SERPL-SCNC: 143 MMOL/L (ref 136–145)
TRIGL SERPL-MCNC: 59 MG/DL (ref 0–150)
VLDLC SERPL CALC-MCNC: 12 MG/DL (ref 5–40)
WBC # BLD AUTO: 5.36 10*3/MM3 (ref 3.4–10.8)

## 2022-03-14 ENCOUNTER — OFFICE VISIT (OUTPATIENT)
Dept: FAMILY MEDICINE CLINIC | Facility: CLINIC | Age: 72
End: 2022-03-14

## 2022-03-14 VITALS
SYSTOLIC BLOOD PRESSURE: 140 MMHG | DIASTOLIC BLOOD PRESSURE: 78 MMHG | BODY MASS INDEX: 24.17 KG/M2 | OXYGEN SATURATION: 93 % | TEMPERATURE: 97.5 F | WEIGHT: 168.8 LBS | HEART RATE: 68 BPM | HEIGHT: 70 IN

## 2022-03-14 DIAGNOSIS — E78.2 MIXED HYPERLIPIDEMIA: Primary | Chronic | ICD-10-CM

## 2022-03-14 PROBLEM — M40.04 POSTURAL KYPHOSIS OF THORACIC REGION: Status: ACTIVE | Noted: 2022-03-14

## 2022-03-14 PROCEDURE — 99213 OFFICE O/P EST LOW 20 MIN: CPT | Performed by: FAMILY MEDICINE

## 2022-03-14 NOTE — PROGRESS NOTES
Subjective   Jem Crowder is a 71 y.o. male.     History of Present Illness follow-up regarding recent metabolic monitoring.  Hyperlipidemia.  Globally doing really well.  Continues to maintain pandemic response staying active.  Exercises great diet.  Medications are as reconciled.  Denies respiratory CDV GI  orthopedic skin concerns.    The following portions of the patient's history were reviewed and updated as appropriate: allergies, current medications, past family history, past social history, past surgical history and problem list.    Review of Systems  See history of Present Illness     Objective     Physical Exam  Vitals reviewed.   Constitutional:       Appearance: Normal appearance.   HENT:      Head: Normocephalic.   Eyes:      Conjunctiva/sclera: Conjunctivae normal.   Cardiovascular:      Rate and Rhythm: Normal rate and regular rhythm.      Heart sounds: Normal heart sounds.   Pulmonary:      Effort: Pulmonary effort is normal.      Breath sounds: Normal breath sounds.      Comments: Stable kyphosis  Musculoskeletal:      Cervical back: Normal range of motion and neck supple.   Skin:     General: Skin is warm and dry.   Neurological:      Mental Status: He is alert and oriented to person, place, and time.   Psychiatric:         Mood and Affect: Mood normal.         PHQ-9 Total Score:      Body mass index is 24.22 kg/m².       Assessment/Plan     Diagnoses and all orders for this visit:    1. Mixed hyperlipidemia (Primary)    We reviewed recent metabolic hematologic parameters.  All very stable.  Platelet count noted.  Known pseudothrombocytopenia.  Has had hematological evaluation in recent past.  We will continue medications as reconciled ordered at this time.  Stay safely active maintain heart healthy diet.  Maintain pandemic response.  Would encourage 6-month follow-up.                     This document has been electronically signed by Marek Khanna MD   March 14, 2022 10:06 EDT    Part of  this note may be an electronic transcription/translation of spoken language to printed text using the Dragon Dictation System.

## 2022-05-26 ENCOUNTER — OFFICE VISIT (OUTPATIENT)
Dept: FAMILY MEDICINE CLINIC | Facility: CLINIC | Age: 72
End: 2022-05-26

## 2022-05-26 VITALS
HEIGHT: 70 IN | TEMPERATURE: 97.7 F | DIASTOLIC BLOOD PRESSURE: 77 MMHG | SYSTOLIC BLOOD PRESSURE: 136 MMHG | OXYGEN SATURATION: 99 % | BODY MASS INDEX: 23.34 KG/M2 | WEIGHT: 163 LBS | HEART RATE: 77 BPM

## 2022-05-26 DIAGNOSIS — L23.7 ALLERGIC CONTACT DERMATITIS DUE TO PLANTS, EXCEPT FOOD: Primary | ICD-10-CM

## 2022-05-26 PROCEDURE — 99213 OFFICE O/P EST LOW 20 MIN: CPT | Performed by: INTERNAL MEDICINE

## 2022-05-26 PROCEDURE — 96372 THER/PROPH/DIAG INJ SC/IM: CPT | Performed by: INTERNAL MEDICINE

## 2022-05-26 RX ORDER — METHYLPREDNISOLONE 4 MG/1
TABLET ORAL
Qty: 21 EACH | Refills: 0 | Status: SHIPPED | OUTPATIENT
Start: 2022-05-26 | End: 2022-06-16

## 2022-05-26 RX ORDER — METHYLPREDNISOLONE SODIUM SUCCINATE 125 MG/2ML
125 INJECTION, POWDER, LYOPHILIZED, FOR SOLUTION INTRAMUSCULAR; INTRAVENOUS ONCE
Status: COMPLETED | OUTPATIENT
Start: 2022-05-26 | End: 2022-05-26

## 2022-05-26 RX ADMIN — METHYLPREDNISOLONE SODIUM SUCCINATE 125 MG: 125 INJECTION, POWDER, LYOPHILIZED, FOR SOLUTION INTRAMUSCULAR; INTRAVENOUS at 15:06

## 2022-05-26 NOTE — PROGRESS NOTES
Patient Name: Jem Crowder Today's Date: 2022   Patient MRN / CSN: [id number] / [id number] Date of Encounter: 2022   Patient Age / : 72 y.o. / 1950 Encounter Provider: Marilin Ward DO   Referring Physician: No ref. provider found          Jem is a 72 y.o. male who is being seen today for Poison Ivy and Transitional Care Management      Poison Ivy  This is a new problem. Episode onset: 2 days ago. The rash is diffuse. The rash is characterized by itchiness and redness. He was exposed to plant contact. Pertinent negatives include no fever or shortness of breath. (He reports history of allergy to poison ivy and poison oak. 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     • diphenhydrAMINE (BENADRYL) 25 MG tablet Take 50 mg by mouth at night as needed for itching or sleep.     • docusate sodium (COLACE) 250 MG capsule Take 240 mg by mouth Daily.     • Magnesium 100 MG capsule Take 100 mg by mouth.     • metroNIDAZOLE (METROGEL) 0.75 % gel Apply  topically to the appropriate area as directed 2 (Two) Times a Day. 45 g 2   • Multiple Vitamin (MULTI VITAMIN MENS) tablet Take 1 tablet by mouth daily.     • simvastatin (ZOCOR) 20 MG tablet TAKE 1 TABLET BY MOUTH EVERY NIGHT. 90 tablet 3   • methylPREDNISolone (MEDROL) 4 MG dose pack Take as directed on package instructions. 21 each 0     Current Facility-Administered Medications   Medication Dose Route Frequency Provider Last Rate Last Admin   • methylPREDNISolone sodium succinate (SOLU-Medrol) injection 125 mg  125 mg Intramuscular Once Marilin Ward DO         Past Medical History:   Diagnosis Date   • Coronary artery disease    • Diverticulitis    • Rosacea      Family History   Problem Relation Age of Onset   • Heart disease Mother    • Hypertension Mother    • Lung disease  "Father         Black Lung   • Stomach cancer Father    • Cancer Brother      Past Surgical History:   Procedure Laterality Date   • CATARACT EXTRACTION Left 2019   • COLONOSCOPY  10/01/2015     Social History     Substance and Sexual Activity   Alcohol Use No     Social History     Tobacco Use   Smoking Status Never Smoker   Smokeless Tobacco Former User   • Types: Chew   • Quit date: 7/21/2000     Social History     Substance and Sexual Activity   Drug Use No     Review of Systems   Constitutional: Negative for fever.   Respiratory: Negative for shortness of breath and wheezing.    Cardiovascular: Negative for chest pain.        Depression Assessment Review:  PHQ-9 Total Score:    Vital Signs & Measurements Taken This Encounter  /77 (BP Location: Left arm, Patient Position: Sitting, Cuff Size: Adult)   Pulse 77   Temp 97.7 °F (36.5 °C) (Temporal)   Ht 177.8 cm (70\")   Wt 73.9 kg (163 lb)   SpO2 99%   BMI 23.39 kg/m²    SpO2 Percentage    05/26/22 1359   SpO2: 99%        BMI is within normal parameters. No other follow-up for BMI required.      Physical Exam  Vitals reviewed.   Constitutional:       General: He is not in acute distress.  HENT:      Head: Normocephalic and atraumatic.      Comments: No facial swelling  Eyes:      General: No scleral icterus.     Extraocular Movements: Extraocular movements intact.      Conjunctiva/sclera: Conjunctivae normal.      Pupils: Pupils are equal, round, and reactive to light.   Cardiovascular:      Rate and Rhythm: Normal rate and regular rhythm.   Pulmonary:      Effort: Pulmonary effort is normal. No respiratory distress.      Breath sounds: Normal breath sounds. No wheezing or rhonchi.   Musculoskeletal:         General: No swelling.   Skin:     General: Skin is warm and dry.      Coloration: Skin is not jaundiced.      Findings: Rash present.      Comments: Diffuse maculopapular rash on upper extremities, neck and right cheek   Neurological:      Mental " Status: He is alert.   Psychiatric:         Mood and Affect: Mood normal.         Behavior: Behavior normal.            Assessment & Plan  Patient Active Problem List   Diagnosis   • Chronic coronary artery disease   • Diverticulosis of sigmoid colon   • Hyperlipidemia   • Internal hemorrhoids   • Rosacea   • Thrombocytopenia (HCC)   • Postural kyphosis of thoracic region       ICD-10-CM ICD-9-CM   1. Allergic contact dermatitis due to plants, except food  L23.7 692.6     No orders of the defined types were placed in this encounter.      Meds Ordered During Visit:  New Medications Ordered This Visit   Medications   • methylPREDNISolone sodium succinate (SOLU-Medrol) injection 125 mg   • methylPREDNISolone (MEDROL) 4 MG dose pack     Sig: Take as directed on package instructions.     Dispense:  21 each     Refill:  0       We will give solumedrol injection today as above. Patient may start medrol dosepak tomorrow. I discussed this with him and he reports tolerating it well previously.      Return if symptoms worsen or fail to improve, for Next scheduled follow up.          Referring Provider (if known): No ref. provider found      This document has been electronically signed by Mairlin Ward DO  May 26, 2022 14:45 EDT    Marilin Ward DO, FACOI  [address] [place] [address]  [phone] (office)    Part of this note may be an electronic transcription/translation of spoken language to printed text using the Dragon Dictation System.

## 2022-05-26 NOTE — PROGRESS NOTES
Injection  Injection performed in left dorsogluteal  by Mine Ye RN. Patient tolerated the procedure well without complications.  05/26/22   Mine Ye RN

## 2022-06-16 ENCOUNTER — OFFICE VISIT (OUTPATIENT)
Dept: FAMILY MEDICINE CLINIC | Facility: CLINIC | Age: 72
End: 2022-06-16

## 2022-06-16 VITALS
WEIGHT: 163 LBS | RESPIRATION RATE: 18 BRPM | HEIGHT: 70 IN | TEMPERATURE: 98.2 F | BODY MASS INDEX: 23.34 KG/M2 | DIASTOLIC BLOOD PRESSURE: 81 MMHG | SYSTOLIC BLOOD PRESSURE: 138 MMHG | HEART RATE: 70 BPM

## 2022-06-16 DIAGNOSIS — L23.7 ALLERGIC CONTACT DERMATITIS DUE TO PLANTS, EXCEPT FOOD: Primary | ICD-10-CM

## 2022-06-16 PROCEDURE — 96372 THER/PROPH/DIAG INJ SC/IM: CPT | Performed by: INTERNAL MEDICINE

## 2022-06-16 PROCEDURE — 99213 OFFICE O/P EST LOW 20 MIN: CPT | Performed by: INTERNAL MEDICINE

## 2022-06-16 RX ORDER — PREDNISONE 10 MG/1
TABLET ORAL
Qty: 30 TABLET | Refills: 0 | Status: SHIPPED | OUTPATIENT
Start: 2022-06-16 | End: 2022-09-15

## 2022-06-16 RX ORDER — METHYLPREDNISOLONE SODIUM SUCCINATE 125 MG/2ML
125 INJECTION, POWDER, LYOPHILIZED, FOR SOLUTION INTRAMUSCULAR; INTRAVENOUS ONCE
Status: COMPLETED | OUTPATIENT
Start: 2022-06-16 | End: 2022-06-16

## 2022-06-16 RX ORDER — TRIAMCINOLONE ACETONIDE 5 MG/G
1 CREAM TOPICAL 2 TIMES DAILY PRN
Qty: 15 G | Refills: 5 | Status: SHIPPED | OUTPATIENT
Start: 2022-06-16 | End: 2022-09-15

## 2022-06-16 RX ADMIN — METHYLPREDNISOLONE SODIUM SUCCINATE 125 MG: 125 INJECTION, POWDER, LYOPHILIZED, FOR SOLUTION INTRAMUSCULAR; INTRAVENOUS at 10:55

## 2022-06-16 NOTE — PROGRESS NOTES
Patient Name: Jem Crowder Today's Date: 2022   Patient MRN / CSN: [id number] / [id number] Date of Encounter: 2022   Patient Age / : 72 y.o. / 1950 Encounter Provider: Marilin Ward DO   Referring Physician: No ref. provider found          Jem is a 72 y.o. male who is being seen today for Poison [place]      Mr. Crowder presents for an acute visit with complaints of itchy rash due to poison oak.  At his last visit with me, we gave him a steroid injection and he was instructed to start a Medrol Dosepak the following day.  He reports his symptoms improved while he was on this therapy but his rash did not completely resolve.  After he finished Medrol, his rash seemed to worsen.  He has itchy rash on his left cheek, upper extremities, lower extremities, and trunk.  He reports commonly have been this rash when exposed to poison oak and sometimes requiring a second round of steroids for resolution of it.  He denies any fever, chest pain, or shortness of air.      Allergies include:Penicillins  Current Outpatient Medications   Medication Sig Dispense Refill   • aspirin 81 MG tablet Take 81 mg by mouth daily.     • diphenhydrAMINE (BENADRYL) 25 MG tablet Take 50 mg by mouth at night as needed for itching or sleep.     • docusate sodium (COLACE) 250 MG capsule Take 240 mg by mouth Daily.     • Magnesium 100 MG capsule Take 100 mg by mouth.     • metroNIDAZOLE (METROGEL) 0.75 % gel Apply  topically to the appropriate area as directed 2 (Two) Times a Day. 45 g 2   • Multiple Vitamin (MULTI VITAMIN MENS) tablet Take 1 tablet by mouth daily.     • simvastatin (ZOCOR) 20 MG tablet TAKE 1 TABLET BY MOUTH EVERY NIGHT. 90 tablet 3   • predniSONE (DELTASONE) 10 MG tablet Take 4 tabs po daily X 3 days then 3 tabs po daily X 3 days then 2 tabs po daily X 3 days then 1 tab po daily X 3 days then stop 30 tablet 0   • triamcinolone (KENALOG) 0.5 % cream Apply 1 application topically to the appropriate area as  "directed 2 (Two) Times a Day As Needed for Rash. 15 g 5     No current facility-administered medications for this visit.     Past Medical History:   Diagnosis Date   • Coronary artery disease    • Diverticulitis    • Rosacea      Family History   Problem Relation Age of Onset   • Heart disease Mother    • Hypertension Mother    • Lung disease Father         Black Lung   • Stomach cancer Father    • Cancer Brother      Past Surgical History:   Procedure Laterality Date   • CATARACT EXTRACTION Left 2019   • COLONOSCOPY  10/01/2015     Social History     Substance and Sexual Activity   Alcohol Use No     Social History     Tobacco Use   Smoking Status Never Smoker   Smokeless Tobacco Former User   • Types: Chew   • Quit date: 7/21/2000     Social History     Substance and Sexual Activity   Drug Use No     Review of Systems   Constitutional: Negative for fever.   Respiratory: Negative for shortness of breath.    Cardiovascular: Negative for chest pain.        Depression Assessment Review:  PHQ-9 Total Score:    Vital Signs & Measurements Taken This Encounter  /81 (BP Location: Left arm, Patient Position: Sitting, Cuff Size: Adult)   Pulse 70   Temp 98.2 °F (36.8 °C) (Temporal)   Resp 18   Ht 177.8 cm (70\")   Wt 73.9 kg (163 lb)   BMI 23.39 kg/m²    There were no vitals filed for this visit.     BMI is within normal parameters. No other follow-up for BMI required.      Physical Exam  Vitals reviewed.   Constitutional:       General: He is not in acute distress.  HENT:      Head: Normocephalic and atraumatic.   Eyes:      General: No scleral icterus.     Extraocular Movements: Extraocular movements intact.      Conjunctiva/sclera: Conjunctivae normal.      Pupils: Pupils are equal, round, and reactive to light.   Cardiovascular:      Rate and Rhythm: Normal rate and regular rhythm.   Pulmonary:      Effort: Pulmonary effort is normal. No respiratory distress.      Breath sounds: Normal breath sounds. No " wheezing or rhonchi.   Musculoskeletal:         General: No swelling.   Skin:     General: Skin is warm and dry.      Coloration: Skin is not jaundiced.      Comments: Maculopapular, erythematous rash noted on left cheek, upper extremities, and trunk.   Neurological:      Mental Status: He is alert.   Psychiatric:         Mood and Affect: Mood normal.         Behavior: Behavior normal.         Assessment & Plan  Patient Active Problem List   Diagnosis   • Chronic coronary artery disease   • Diverticulosis of sigmoid colon   • Hyperlipidemia   • Internal hemorrhoids   • Rosacea   • Thrombocytopenia (HCC)   • Postural kyphosis of thoracic region       ICD-10-CM ICD-9-CM   1. Allergic contact dermatitis due to plants, except food  L23.7 692.6     No orders of the defined types were placed in this encounter.      Meds Ordered During Visit:  New Medications Ordered This Visit   Medications   • methylPREDNISolone sodium succinate (SOLU-Medrol) injection 125 mg   • triamcinolone (KENALOG) 0.5 % cream     Sig: Apply 1 application topically to the appropriate area as directed 2 (Two) Times a Day As Needed for Rash.     Dispense:  15 g     Refill:  5   • predniSONE (DELTASONE) 10 MG tablet     Sig: Take 4 tabs po daily X 3 days then 3 tabs po daily X 3 days then 2 tabs po daily X 3 days then 1 tab po daily X 3 days then stop     Dispense:  30 tablet     Refill:  0       Solu-Medrol injection given today as above.  I instructed patient to start prednisone taper tomorrow and take this with food.  I instructed him to use triamcinolone cream in the future as needed for this rash but to avoid use of triamcinolone cream on the face.  Also recommended he avoid prolonged use of topical steroid (no longer than 2 weeks).    Return in about 3 months (around 9/16/2022), or if symptoms worsen or fail to improve, for Recheck, Next scheduled follow up.          Referring Provider (if known): No ref. provider found      This document has  been electronically signed by Marilin Ward DO  June 16, 2022 10:56 EDT    Marilin Ward DO, FACOI  [address] [place] [address]  [phone] (office)    Part of this note may be an electronic transcription/translation of spoken language to printed text using the Dragon Dictation System.

## 2022-07-26 DIAGNOSIS — E78.2 MIXED HYPERLIPIDEMIA: ICD-10-CM

## 2022-07-26 DIAGNOSIS — L71.9 ROSACEA: ICD-10-CM

## 2022-07-26 RX ORDER — METRONIDAZOLE 7.5 MG/G
GEL TOPICAL 2 TIMES DAILY
Qty: 45 G | Refills: 2 | Status: SHIPPED | OUTPATIENT
Start: 2022-07-26

## 2022-07-26 RX ORDER — SIMVASTATIN 20 MG
20 TABLET ORAL NIGHTLY
Qty: 90 TABLET | Refills: 3 | Status: SHIPPED | OUTPATIENT
Start: 2022-07-26

## 2022-07-26 NOTE — TELEPHONE ENCOUNTER
Caller: [place], [place] - [address] - [phone] [facility] [phone]    Relationship: Pharmacy    Best call back number: [phone]    Requested Prescriptions:   Requested Prescriptions     Pending Prescriptions Disp Refills   • simvastatin (ZOCOR) 20 MG tablet 90 tablet 3     Sig: Take 1 tablet by mouth Every Night.   • metroNIDAZOLE (METROGEL) 0.75 % gel 45 g 2     Sig: Apply  topically to the appropriate area as directed 2 (Two) Times a Day.        Pharmacy where request should be sent: [place], [place] - [address] - [phone] [facility] [phone] FX     Additional details provided by patient: PATIENT AT PHARMACY- WAS REQUESTED 7/25/22 AND NO RESPONSE.     Does the patient have less than a 3 day supply:  [x] Yes  [] No    Efraín Hay Rep   07/26/22 11:15 EDT

## 2022-09-15 ENCOUNTER — OFFICE VISIT (OUTPATIENT)
Dept: FAMILY MEDICINE CLINIC | Facility: CLINIC | Age: 72
End: 2022-09-15

## 2022-09-15 VITALS
SYSTOLIC BLOOD PRESSURE: 132 MMHG | OXYGEN SATURATION: 100 % | DIASTOLIC BLOOD PRESSURE: 78 MMHG | HEIGHT: 70 IN | HEART RATE: 60 BPM | BODY MASS INDEX: 23.99 KG/M2 | WEIGHT: 167.6 LBS | TEMPERATURE: 97.1 F

## 2022-09-15 DIAGNOSIS — D69.6 THROMBOCYTOPENIA: Chronic | ICD-10-CM

## 2022-09-15 DIAGNOSIS — Z00.00 ENCOUNTER FOR WELLNESS EXAMINATION: Primary | ICD-10-CM

## 2022-09-15 DIAGNOSIS — Z00.00 HEALTH CARE MAINTENANCE: ICD-10-CM

## 2022-09-15 DIAGNOSIS — E78.2 MIXED HYPERLIPIDEMIA: Chronic | ICD-10-CM

## 2022-09-15 DIAGNOSIS — I25.10 CHRONIC CORONARY ARTERY DISEASE: Chronic | ICD-10-CM

## 2022-09-15 DIAGNOSIS — Z12.5 PROSTATE CANCER SCREENING: ICD-10-CM

## 2022-09-15 PROCEDURE — 1159F MED LIST DOCD IN RCRD: CPT | Performed by: INTERNAL MEDICINE

## 2022-09-15 PROCEDURE — 36415 COLL VENOUS BLD VENIPUNCTURE: CPT | Performed by: INTERNAL MEDICINE

## 2022-09-15 PROCEDURE — G0439 PPPS, SUBSEQ VISIT: HCPCS | Performed by: INTERNAL MEDICINE

## 2022-09-15 PROCEDURE — 1170F FXNL STATUS ASSESSED: CPT | Performed by: INTERNAL MEDICINE

## 2022-09-15 PROCEDURE — 96160 PT-FOCUSED HLTH RISK ASSMT: CPT | Performed by: INTERNAL MEDICINE

## 2022-09-15 NOTE — PROGRESS NOTES
The ABCs of the Annual Wellness Visit  Subsequent Medicare Wellness Visit    Chief Complaint   Patient presents with   • Follow-up   • Medicare Wellness-subsequent      Subjective    History of Present Illness:  Jem Crowder is a 72 y.o. male who presents for a Subsequent Medicare Wellness Visit.    The following portions of the patient's history were reviewed and   updated as appropriate: allergies, current medications, past family history, past medical history, past social history, past surgical history and problem list.    Compared to one year ago, the patient feels his physical   health is the same.    Compared to one year ago, the patient feels his mental   health is the same.    Recent Hospitalizations:  He was not admitted to the hospital during the last year.       Current Medical Providers:  Patient Care Team:  Marilin Ward DO as PCP - General (Family Medicine)  Pat Hooks MD as Consulting Physician (Hematology and Oncology)    Outpatient Medications Prior to Visit   Medication Sig Dispense Refill   • aspirin 81 MG tablet Take 81 mg by mouth daily.     • diphenhydrAMINE (BENADRYL) 25 MG tablet Take 50 mg by mouth at night as needed for itching or sleep.     • docusate sodium (COLACE) 250 MG capsule Take 240 mg by mouth Daily.     • Magnesium 100 MG capsule Take 100 mg by mouth.     • metroNIDAZOLE (METROGEL) 0.75 % gel Apply  topically to the appropriate area as directed 2 (Two) Times a Day. 45 g 2   • Multiple Vitamin (MULTI VITAMIN MENS) tablet Take 1 tablet by mouth daily.     • simvastatin (ZOCOR) 20 MG tablet Take 1 tablet by mouth Every Night. 90 tablet 3   • predniSONE (DELTASONE) 10 MG tablet Take 4 tabs po daily X 3 days then 3 tabs po daily X 3 days then 2 tabs po daily X 3 days then 1 tab po daily X 3 days then stop 30 tablet 0   • triamcinolone (KENALOG) 0.5 % cream Apply 1 application topically to the appropriate area as directed 2 (Two) Times a Day As Needed for Rash. 15 g 5  "    No facility-administered medications prior to visit.       No opioid medication identified on active medication list. I have reviewed chart for other potential  high risk medication/s and harmful drug interactions in the elderly.          Aspirin is on active medication list. Aspirin use is indicated based on review of current medical condition/s. Pros and cons of this therapy have been discussed today. Benefits of this medication outweigh potential harm.  Patient has been encouraged to continue taking this medication.  .      Patient Active Problem List   Diagnosis   • Chronic coronary artery disease   • Diverticulosis of sigmoid colon   • Hyperlipidemia   • Internal hemorrhoids   • Rosacea   • Thrombocytopenia (HCC)   • Postural kyphosis of thoracic region     Advance Care Planning  Advance Directive is on file.  ACP discussion was held with the patient during this visit. Patient has an advance directive in EMR which is still valid.     Review of Systems   Constitutional: Negative for fever.   Respiratory: Negative for shortness of breath.    Cardiovascular: Negative for chest pain.   Gastrointestinal: Negative for abdominal pain and blood in stool.   Genitourinary: Negative for hematuria.        Objective    Vitals:    09/15/22 0824 09/15/22 0914   BP: 158/90 132/78   BP Location: Left arm Left arm   Patient Position: Sitting Sitting   Cuff Size: Adult Adult   Pulse: 60    Temp: 97.1 °F (36.2 °C)    TempSrc: Temporal    SpO2: 100%    Weight: 76 kg (167 lb 9.6 oz)    Height: 177.8 cm (70\")    PainSc: 0-No pain      Estimated body mass index is 24.05 kg/m² as calculated from the following:    Height as of this encounter: 177.8 cm (70\").    Weight as of this encounter: 76 kg (167 lb 9.6 oz).    BMI is within normal parameters. No other follow-up for BMI required.      Does the patient have evidence of cognitive impairment? No    Physical Exam  Vitals reviewed.   Constitutional:       General: He is not in acute " distress.  HENT:      Head: Normocephalic and atraumatic.   Eyes:      General: No scleral icterus.     Extraocular Movements: Extraocular movements intact.      Conjunctiva/sclera: Conjunctivae normal.      Pupils: Pupils are equal, round, and reactive to light.   Cardiovascular:      Rate and Rhythm: Normal rate and regular rhythm.   Pulmonary:      Effort: Pulmonary effort is normal. No respiratory distress.      Breath sounds: Normal breath sounds. No wheezing or rhonchi.   Musculoskeletal:         General: No swelling.   Skin:     General: Skin is warm and dry.      Coloration: Skin is not jaundiced.   Neurological:      Mental Status: He is alert.   Psychiatric:         Mood and Affect: Mood normal.         Behavior: Behavior normal.                 HEALTH RISK ASSESSMENT    Smoking Status:  Social History     Tobacco Use   Smoking Status Never Smoker   Smokeless Tobacco Former User   • Types: Chew   • Quit date: 7/21/2000     Alcohol Consumption:  Social History     Substance and Sexual Activity   Alcohol Use No     Fall Risk Screen:    [place] Fall Risk Assessment was completed, and patient is at LOW risk for falls.Assessment completed on:9/15/2022    Depression Screening:  PHQ-2/PHQ-9 Depression Screening 9/15/2022   Retired PHQ-9 Total Score -   Retired Total Score -   Little Interest or Pleasure in Doing Things 0-->not at all   Feeling Down, Depressed or Hopeless 0-->not at all   PHQ-9: Brief Depression Severity Measure Score 0       Health Habits and Functional and Cognitive Screening:  Functional & Cognitive Status 9/15/2022   Do you have difficulty preparing food and eating? No   Do you have difficulty bathing yourself, getting dressed or grooming yourself? No   Do you have difficulty using the toilet? No   Do you have difficulty moving around from place to place? No   Do you have trouble with steps or getting out of a bed or a chair? No   Current Diet Well Balanced Diet   Dental Exam Up to date   Eye  Exam Up to date   Exercise (times per week) 7 times per week   Current Exercises Include Walking   Current Exercise Activities Include -   Do you need help using the phone?  No   Are you deaf or do you have serious difficulty hearing?  No   Do you need help with transportation? No   Do you need help shopping? No   Do you need help preparing meals?  No   Do you need help with housework?  No   Do you need help with laundry? No   Do you need help taking your medications? No   Do you need help managing money? No   Do you ever drive or ride in a car without wearing a seat belt? No   Have you felt unusual stress, anger or loneliness in the last month? No   Who do you live with? Alone   If you need help, do you have trouble finding someone available to you? No   Have you been bothered in the last four weeks by sexual problems? No   Do you have difficulty concentrating, remembering or making decisions? No       Age-appropriate Screening Schedule:  Refer to the list below for future screening recommendations based on patient's age, sex and/or medical conditions. Orders for these recommended tests are listed in the plan section. The patient has been provided with a written plan.    Health Maintenance   Topic Date Due   • INFLUENZA VACCINE  10/01/2022   • LIPID PANEL  03/09/2023   • TDAP/TD VACCINES (4 - Td or Tdap) 10/08/2029   • ZOSTER VACCINE  Completed              Assessment & Plan   CMS Preventative Services Quick Reference  Risk Factors Identified During Encounter    Immunizations Discussed/Encouraged (specific Immunizations; COVID19. I recommended he update this at the pharmacy or Health Dept.     Age appropriate screenings were discussed with patient today. We will update metabolic screenings today. He is up to date on prostate and colon screenings.     Medications were reviewed. Patient is up to date on refills.       The above risks/problems have been discussed with the patient.  Follow up actions/plans if indicated  are seen below in the Assessment/Plan Section.  Pertinent information has been shared with the patient in the After Visit Summary.    Diagnoses and all orders for this visit:    1. Encounter for wellness examination (Primary)  -     Comprehensive Metabolic Panel  -     CBC (No Diff)  -     Lipid Panel  -     TSH    2. Health care maintenance  -     Comprehensive Metabolic Panel  -     CBC (No Diff)  -     Lipid Panel  -     TSH    3. Chronic coronary artery disease  -     Comprehensive Metabolic Panel  -     CBC (No Diff)  -     Lipid Panel  -     TSH  -     CBC (No Diff); Future  -     Comprehensive Metabolic Panel; Future  -     Lipid Panel; Future  -     TSH; Future    4. Mixed hyperlipidemia  -     Comprehensive Metabolic Panel  -     CBC (No Diff)  -     Lipid Panel  -     TSH  -     CBC (No Diff); Future  -     Comprehensive Metabolic Panel; Future  -     Lipid Panel; Future  -     TSH; Future    5. Thrombocytopenia (HCC)  -     Comprehensive Metabolic Panel  -     CBC (No Diff)  -     Lipid Panel  -     TSH  -     CBC (No Diff); Future  -     Comprehensive Metabolic Panel; Future  -     Lipid Panel; Future  -     TSH; Future    6. Prostate cancer screening  -     PSA Screen; Future        Follow Up:   Return in about 6 months (around 3/15/2023), or if symptoms worsen or fail to improve, for Recheck.     An After Visit Summary and PPPS were made available to the patient.                   This document has been electronically signed by Marilin Ward DO  September 15, 2022 09:16 EDT

## 2022-09-15 NOTE — PROGRESS NOTES
Venipuncture Blood Specimen Collection  Venipuncture performed in LEFT ARM by Mine Finch RN with good hemostasis. Patient tolerated the procedure well without complications.   09/15/22   Mine Finch RN

## 2022-09-16 LAB
ALBUMIN SERPL-MCNC: 4.5 G/DL (ref 3.5–5.2)
ALBUMIN/GLOB SERPL: 2.5 G/DL
ALP SERPL-CCNC: 58 U/L (ref 39–117)
ALT SERPL-CCNC: 24 U/L (ref 1–41)
AST SERPL-CCNC: 27 U/L (ref 1–40)
BILIRUB SERPL-MCNC: 0.4 MG/DL (ref 0–1.2)
BUN SERPL-MCNC: 16 MG/DL (ref 8–23)
BUN/CREAT SERPL: 16.8 (ref 7–25)
CALCIUM SERPL-MCNC: 9.5 MG/DL (ref 8.6–10.5)
CHLORIDE SERPL-SCNC: 107 MMOL/L (ref 98–107)
CHOLEST SERPL-MCNC: 151 MG/DL (ref 0–200)
CO2 SERPL-SCNC: 27.1 MMOL/L (ref 22–29)
CREAT SERPL-MCNC: 0.95 MG/DL (ref 0.76–1.27)
ERYTHROCYTE [DISTWIDTH] IN BLOOD BY AUTOMATED COUNT: 12.3 % (ref 12.3–15.4)
GLOBULIN SER CALC-MCNC: 1.8 GM/DL
GLUCOSE SERPL-MCNC: 87 MG/DL (ref 65–99)
HCT VFR BLD AUTO: 39 % (ref 37.5–51)
HDLC SERPL-MCNC: 76 MG/DL (ref 40–60)
HGB BLD-MCNC: 13.3 G/DL (ref 13–17.7)
LDLC SERPL CALC-MCNC: 63 MG/DL (ref 0–100)
MCH RBC QN AUTO: 32.6 PG (ref 26.6–33)
MCHC RBC AUTO-ENTMCNC: 34.1 G/DL (ref 31.5–35.7)
MCV RBC AUTO: 95.6 FL (ref 79–97)
PLATELET # BLD AUTO: ABNORMAL 10*3/MM3
POTASSIUM SERPL-SCNC: 4.1 MMOL/L (ref 3.5–5.2)
PROT SERPL-MCNC: 6.3 G/DL (ref 6–8.5)
RBC # BLD AUTO: 4.08 10*6/MM3 (ref 4.14–5.8)
SODIUM SERPL-SCNC: 145 MMOL/L (ref 136–145)
TRIGL SERPL-MCNC: 58 MG/DL (ref 0–150)
TSH SERPL DL<=0.005 MIU/L-ACNC: 1.73 UIU/ML (ref 0.27–4.2)
VLDLC SERPL CALC-MCNC: 12 MG/DL (ref 5–40)
WBC # BLD AUTO: 5.72 10*3/MM3 (ref 3.4–10.8)

## 2022-10-24 ENCOUNTER — CLINICAL SUPPORT (OUTPATIENT)
Dept: FAMILY MEDICINE CLINIC | Facility: CLINIC | Age: 72
End: 2022-10-24

## 2022-10-24 DIAGNOSIS — Z23 FLU VACCINE NEED: Primary | ICD-10-CM

## 2022-10-24 PROCEDURE — G0008 ADMIN INFLUENZA VIRUS VAC: HCPCS | Performed by: INTERNAL MEDICINE

## 2022-10-24 PROCEDURE — 90662 IIV NO PRSV INCREASED AG IM: CPT | Performed by: INTERNAL MEDICINE

## 2022-10-24 NOTE — PROGRESS NOTES
Immunization  Immunization performed in Right Deltoid by Jackie Calvo MA. Patient tolerated the procedure well without complications.  10/24/22   Jackie Calvo MA

## 2023-03-10 ENCOUNTER — CLINICAL SUPPORT (OUTPATIENT)
Dept: FAMILY MEDICINE CLINIC | Facility: CLINIC | Age: 73
End: 2023-03-10
Payer: MEDICARE

## 2023-03-10 DIAGNOSIS — M40.04 POSTURAL KYPHOSIS OF THORACIC REGION: ICD-10-CM

## 2023-03-10 DIAGNOSIS — K57.30 DIVERTICULOSIS OF SIGMOID COLON: Chronic | ICD-10-CM

## 2023-03-10 DIAGNOSIS — K64.8 INTERNAL HEMORRHOIDS: ICD-10-CM

## 2023-03-10 DIAGNOSIS — D69.6 THROMBOCYTOPENIA: Chronic | ICD-10-CM

## 2023-03-10 DIAGNOSIS — I25.10 CHRONIC CORONARY ARTERY DISEASE: Chronic | ICD-10-CM

## 2023-03-10 DIAGNOSIS — Z12.5 PROSTATE CANCER SCREENING: ICD-10-CM

## 2023-03-10 DIAGNOSIS — E78.2 MIXED HYPERLIPIDEMIA: Chronic | ICD-10-CM

## 2023-03-10 PROCEDURE — 36415 COLL VENOUS BLD VENIPUNCTURE: CPT | Performed by: NURSE PRACTITIONER

## 2023-03-11 LAB
ALBUMIN SERPL-MCNC: 4.7 G/DL (ref 3.5–5.2)
ALBUMIN/GLOB SERPL: 2.4 G/DL
ALP SERPL-CCNC: 58 U/L (ref 39–117)
ALT SERPL-CCNC: 21 U/L (ref 1–41)
AST SERPL-CCNC: 21 U/L (ref 1–40)
BILIRUB SERPL-MCNC: 0.6 MG/DL (ref 0–1.2)
BUN SERPL-MCNC: 14 MG/DL (ref 8–23)
BUN/CREAT SERPL: 14.9 (ref 7–25)
CALCIUM SERPL-MCNC: 9.5 MG/DL (ref 8.6–10.5)
CHLORIDE SERPL-SCNC: 106 MMOL/L (ref 98–107)
CHOLEST SERPL-MCNC: 152 MG/DL (ref 0–200)
CO2 SERPL-SCNC: 26.5 MMOL/L (ref 22–29)
CREAT SERPL-MCNC: 0.94 MG/DL (ref 0.76–1.27)
ERYTHROCYTE [DISTWIDTH] IN BLOOD BY AUTOMATED COUNT: 12 % (ref 12.3–15.4)
GLOBULIN SER CALC-MCNC: 2 GM/DL
GLUCOSE SERPL-MCNC: 78 MG/DL (ref 65–99)
HCT VFR BLD AUTO: 38.8 % (ref 37.5–51)
HDLC SERPL-MCNC: 76 MG/DL (ref 40–60)
HGB BLD-MCNC: 12.9 G/DL (ref 13–17.7)
LDLC SERPL CALC-MCNC: 65 MG/DL (ref 0–100)
MCH RBC QN AUTO: 31.7 PG (ref 26.6–33)
MCHC RBC AUTO-ENTMCNC: 33.2 G/DL (ref 31.5–35.7)
MCV RBC AUTO: 95.3 FL (ref 79–97)
PLATELET # BLD AUTO: 128 10*3/MM3 (ref 140–450)
POTASSIUM SERPL-SCNC: 3.9 MMOL/L (ref 3.5–5.2)
PROT SERPL-MCNC: 6.7 G/DL (ref 6–8.5)
PSA SERPL-MCNC: 1.75 NG/ML (ref 0–4)
RBC # BLD AUTO: 4.07 10*6/MM3 (ref 4.14–5.8)
SODIUM SERPL-SCNC: 144 MMOL/L (ref 136–145)
TRIGL SERPL-MCNC: 51 MG/DL (ref 0–150)
TSH SERPL DL<=0.005 MIU/L-ACNC: 1.56 UIU/ML (ref 0.27–4.2)
VLDLC SERPL CALC-MCNC: 11 MG/DL (ref 5–40)
WBC # BLD AUTO: 4.98 10*3/MM3 (ref 3.4–10.8)

## 2023-03-15 ENCOUNTER — OFFICE VISIT (OUTPATIENT)
Dept: FAMILY MEDICINE CLINIC | Facility: CLINIC | Age: 73
End: 2023-03-15
Payer: MEDICARE

## 2023-03-15 VITALS
HEART RATE: 64 BPM | OXYGEN SATURATION: 96 % | BODY MASS INDEX: 24.77 KG/M2 | DIASTOLIC BLOOD PRESSURE: 74 MMHG | SYSTOLIC BLOOD PRESSURE: 132 MMHG | TEMPERATURE: 97.3 F | WEIGHT: 172.6 LBS

## 2023-03-15 DIAGNOSIS — I25.10 CHRONIC CORONARY ARTERY DISEASE: Primary | Chronic | ICD-10-CM

## 2023-03-15 DIAGNOSIS — D69.6 THROMBOCYTOPENIA: Chronic | ICD-10-CM

## 2023-03-15 DIAGNOSIS — E78.2 MIXED HYPERLIPIDEMIA: Chronic | ICD-10-CM

## 2023-03-15 PROCEDURE — 99214 OFFICE O/P EST MOD 30 MIN: CPT | Performed by: INTERNAL MEDICINE

## 2023-03-15 PROCEDURE — 1159F MED LIST DOCD IN RCRD: CPT | Performed by: INTERNAL MEDICINE

## 2023-03-15 PROCEDURE — 1160F RVW MEDS BY RX/DR IN RCRD: CPT | Performed by: INTERNAL MEDICINE

## 2023-03-15 NOTE — PROGRESS NOTES
Patient Name: Jem Crowder Today's Date: 3/15/2023   Patient MRN / CSN: [id number] / [id number] Date of Encounter: 3/15/2023   Patient Age / : 72 y.o. / 1950 Encounter Provider: Marilin Ward DO   Referring Physician: No ref. provider found          Jem is a 72 y.o. male who is being seen today for Follow-up      History of Present Illness     Jem presents today for follow-up on coronary artery disease and hyperlipidemia.  He reports doing well with his current medicine regimen including aspirin and Zocor.  He is up-to-date on refills at this time.  He denies any recent chest pain or shortness of air.  He has a history of thrombocytopenia.  His latest labs showed platelet count of 128, which is improved from previous.  He denies any recent bleeding.  His LDL is stable at 65 with total cholesterol of 152 MG/DL.    Allergies include:Penicillins  Current Outpatient Medications   Medication Sig Dispense Refill   • aspirin 81 MG tablet Take 1 tablet by mouth Daily.     • diphenhydrAMINE (BENADRYL) 25 MG tablet Take 2 tablets by mouth At Night As Needed for Itching or Sleep.     • docusate sodium (COLACE) 250 MG capsule Take 240 mg by mouth Daily.     • Magnesium 100 MG capsule Take 100 mg by mouth.     • metroNIDAZOLE (METROGEL) 0.75 % gel Apply  topically to the appropriate area as directed 2 (Two) Times a Day. 45 g 2   • Multiple Vitamin (MULTI VITAMIN MENS) tablet Take 1 tablet by mouth Daily.     • simvastatin (ZOCOR) 20 MG tablet Take 1 tablet by mouth Every Night. 90 tablet 3     No current facility-administered medications for this visit.     Past Medical History:   Diagnosis Date   • Coronary artery disease    • Diverticulitis    • Rosacea      Family History   Problem Relation Age of Onset   • Heart disease Mother    • Hypertension Mother    • Lung disease Father         Black Lung   • Stomach cancer Father    • Cancer Brother      Past Surgical History:   Procedure Laterality Date   •  CATARACT EXTRACTION Left 2019   • COLONOSCOPY  10/01/2015     Social History     Substance and Sexual Activity   Alcohol Use No     Social History     Tobacco Use   Smoking Status Never   Smokeless Tobacco Former   • Types: Chew   • Quit date: 7/21/2000     Social History     Substance and Sexual Activity   Drug Use No     Review of Systems   Constitutional: Negative for fever.   Respiratory: Negative for shortness of breath.    Cardiovascular: Negative for chest pain.   Gastrointestinal: Negative for abdominal pain and blood in stool.   Genitourinary: Negative for hematuria.        Depression Assessment Review:  PHQ-9 Total Score: 0  Vital Signs & Measurements Taken This Encounter  /74 (BP Location: Right arm, Patient Position: Sitting, Cuff Size: Adult)   Pulse 64   Temp 97.3 °F (36.3 °C) (Temporal)   Wt 78.3 kg (172 lb 9.6 oz)   SpO2 96%   BMI 24.77 kg/m²    SpO2 Percentage    03/15/23 0853   SpO2: 96%        BMI is within normal parameters. No other follow-up for BMI required.      Physical Exam  Vitals reviewed.   Constitutional:       General: He is not in acute distress.  HENT:      Head: Normocephalic and atraumatic.   Eyes:      General: No scleral icterus.     Extraocular Movements: Extraocular movements intact.      Conjunctiva/sclera: Conjunctivae normal.      Pupils: Pupils are equal, round, and reactive to light.   Cardiovascular:      Rate and Rhythm: Normal rate and regular rhythm.   Pulmonary:      Effort: Pulmonary effort is normal. No respiratory distress.      Breath sounds: Normal breath sounds. No wheezing or rhonchi.   Musculoskeletal:         General: No swelling.      Cervical back: Neck supple. No tenderness.   Lymphadenopathy:      Cervical: No cervical adenopathy.   Skin:     General: Skin is warm and dry.      Coloration: Skin is not jaundiced.   Neurological:      Mental Status: He is alert.   Psychiatric:         Mood and Affect: Mood normal.         Behavior: Behavior  normal.              Assessment & Plan  Patient Active Problem List   Diagnosis   • Chronic coronary artery disease   • Diverticulosis of sigmoid colon   • Hyperlipidemia   • Internal hemorrhoids   • Rosacea   • Thrombocytopenia (HCC)   • Postural kyphosis of thoracic region       ICD-10-CM ICD-9-CM   1. Chronic coronary artery disease  I25.10 414.00   2. Mixed hyperlipidemia  E78.2 272.2   3. Thrombocytopenia (HCC)  D69.6 287.5     Orders Placed This Encounter   Procedures   • CBC (No Diff)     Standing Status:   Future     Standing Expiration Date:   3/15/2024     Order Specific Question:   Release to patient     Answer:   Routine Release   • Comprehensive Metabolic Panel     Standing Status:   Future     Standing Expiration Date:   3/15/2024     Order Specific Question:   Release to patient     Answer:   Routine Release   • Lipid Panel     Standing Status:   Future     Standing Expiration Date:   3/15/2024   • TSH     Standing Status:   Future     Standing Expiration Date:   3/15/2024     Order Specific Question:   Release to patient     Answer:   Routine Release       Meds Ordered During Visit:  No orders of the defined types were placed in this encounter.    I reviewed recent labs with patient today.  We will update labs just prior to his next appointment as above.  We will continue current medicine regimen at this time.  Patient reports being up-to-date on refills at this time.    Return in about 6 months (around 9/15/2023), or if symptoms worsen or fail to improve, for Medicare Wellness, Labs Prior.          Referring Provider (if known): No ref. provider found      This document has been electronically signed by Marilin Ward DO  March 15, 2023 10:04 EDT    Marilin Ward DO, FACOI  [address] [place] [address]  [phone] (office)    Part of this note may be an electronic transcription/translation of spoken language to printed text using the Dragon Dictation System.

## 2023-08-25 DIAGNOSIS — E78.2 MIXED HYPERLIPIDEMIA: ICD-10-CM

## 2023-08-25 DIAGNOSIS — L71.9 ROSACEA: ICD-10-CM

## 2023-08-26 RX ORDER — METRONIDAZOLE 7.5 MG/G
GEL TOPICAL 2 TIMES DAILY
Qty: 45 G | Refills: 2 | Status: SHIPPED | OUTPATIENT
Start: 2023-08-26

## 2023-08-26 RX ORDER — SIMVASTATIN 20 MG
20 TABLET ORAL NIGHTLY
Qty: 90 TABLET | Refills: 1 | Status: SHIPPED | OUTPATIENT
Start: 2023-08-26

## 2023-09-13 ENCOUNTER — CLINICAL SUPPORT (OUTPATIENT)
Dept: FAMILY MEDICINE CLINIC | Facility: CLINIC | Age: 73
End: 2023-09-13
Payer: MEDICARE

## 2023-09-13 DIAGNOSIS — I25.10 CHRONIC CORONARY ARTERY DISEASE: Chronic | ICD-10-CM

## 2023-09-13 DIAGNOSIS — E78.2 MIXED HYPERLIPIDEMIA: Chronic | ICD-10-CM

## 2023-09-13 DIAGNOSIS — D69.6 THROMBOCYTOPENIA: Chronic | ICD-10-CM

## 2023-09-13 PROCEDURE — 36415 COLL VENOUS BLD VENIPUNCTURE: CPT | Performed by: INTERNAL MEDICINE

## 2023-09-13 NOTE — PROGRESS NOTES
Venipuncture Blood Specimen Collection  Venipuncture performed in Right arm by Jackie Calvo MA with good hemostasis. Patient tolerated the procedure well without complications.   09/13/23   Jackie Calvo MA

## 2023-09-14 LAB
ALBUMIN SERPL-MCNC: 4.5 G/DL (ref 3.5–5.2)
ALBUMIN/GLOB SERPL: 2.4 G/DL
ALP SERPL-CCNC: 57 U/L (ref 39–117)
ALT SERPL-CCNC: 19 U/L (ref 1–41)
AST SERPL-CCNC: 20 U/L (ref 1–40)
BILIRUB SERPL-MCNC: 0.5 MG/DL (ref 0–1.2)
BUN SERPL-MCNC: 18 MG/DL (ref 8–23)
BUN/CREAT SERPL: 17.5 (ref 7–25)
CALCIUM SERPL-MCNC: 9.2 MG/DL (ref 8.6–10.5)
CHLORIDE SERPL-SCNC: 108 MMOL/L (ref 98–107)
CHOLEST SERPL-MCNC: 144 MG/DL (ref 0–200)
CO2 SERPL-SCNC: 28.3 MMOL/L (ref 22–29)
CREAT SERPL-MCNC: 1.03 MG/DL (ref 0.76–1.27)
ERYTHROCYTE [DISTWIDTH] IN BLOOD BY AUTOMATED COUNT: 12.5 % (ref 12.3–15.4)
GLOBULIN SER CALC-MCNC: 1.9 GM/DL
GLUCOSE SERPL-MCNC: 77 MG/DL (ref 65–99)
HCT VFR BLD AUTO: 38.5 % (ref 37.5–51)
HDLC SERPL-MCNC: 66 MG/DL (ref 40–60)
HGB BLD-MCNC: 13.1 G/DL (ref 13–17.7)
LDLC SERPL CALC-MCNC: 67 MG/DL (ref 0–100)
MCH RBC QN AUTO: 32.8 PG (ref 26.6–33)
MCHC RBC AUTO-ENTMCNC: 34 G/DL (ref 31.5–35.7)
MCV RBC AUTO: 96.3 FL (ref 79–97)
PLATELET # BLD AUTO: ABNORMAL 10*3/MM3
POTASSIUM SERPL-SCNC: 4 MMOL/L (ref 3.5–5.2)
PROT SERPL-MCNC: 6.4 G/DL (ref 6–8.5)
RBC # BLD AUTO: 4 10*6/MM3 (ref 4.14–5.8)
SODIUM SERPL-SCNC: 143 MMOL/L (ref 136–145)
TRIGL SERPL-MCNC: 51 MG/DL (ref 0–150)
TSH SERPL DL<=0.005 MIU/L-ACNC: 2.12 UIU/ML (ref 0.27–4.2)
VLDLC SERPL CALC-MCNC: 11 MG/DL (ref 5–40)
WBC # BLD AUTO: 5.38 10*3/MM3 (ref 3.4–10.8)

## 2023-09-21 ENCOUNTER — OFFICE VISIT (OUTPATIENT)
Dept: FAMILY MEDICINE CLINIC | Facility: CLINIC | Age: 73
End: 2023-09-21
Payer: MEDICARE

## 2023-09-21 VITALS
HEART RATE: 67 BPM | WEIGHT: 164 LBS | HEIGHT: 70 IN | SYSTOLIC BLOOD PRESSURE: 124 MMHG | DIASTOLIC BLOOD PRESSURE: 86 MMHG | BODY MASS INDEX: 23.48 KG/M2 | TEMPERATURE: 97.8 F | OXYGEN SATURATION: 100 %

## 2023-09-21 DIAGNOSIS — Z12.5 PROSTATE CANCER SCREENING: ICD-10-CM

## 2023-09-21 DIAGNOSIS — I25.10 CHRONIC CORONARY ARTERY DISEASE: Primary | Chronic | ICD-10-CM

## 2023-09-21 DIAGNOSIS — L71.9 ROSACEA: ICD-10-CM

## 2023-09-21 DIAGNOSIS — E78.2 MIXED HYPERLIPIDEMIA: ICD-10-CM

## 2023-09-21 RX ORDER — METRONIDAZOLE 7.5 MG/G
GEL TOPICAL 2 TIMES DAILY
Qty: 45 G | Refills: 5 | Status: SHIPPED | OUTPATIENT
Start: 2023-09-21

## 2023-09-21 RX ORDER — SIMVASTATIN 20 MG
20 TABLET ORAL NIGHTLY
Qty: 90 TABLET | Refills: 1 | Status: SHIPPED | OUTPATIENT
Start: 2023-09-21

## 2023-09-21 NOTE — PROGRESS NOTES
The ABCs of the Annual Wellness Visit  Subsequent Medicare Wellness Visit    Katharine Crowder is a 73 y.o. male who presents for a Subsequent Medicare Wellness Visit.    The following portions of the patient's history were reviewed and   updated as appropriate: allergies, current medications, past family history, past medical history, past social history, past surgical history, and problem list.    Compared to one year ago, the patient feels his physical   health is the same.    Compared to one year ago, the patient feels his mental   health is the same.    Recent Hospitalizations:  He was not admitted to the hospital during the last year.       Current Medical Providers:  Patient Care Team:  Marilin Ward DO as PCP - General (Family Medicine)  Pat Hooks MD as Consulting Physician (Hematology and Oncology)    Outpatient Medications Prior to Visit   Medication Sig Dispense Refill    aspirin 81 MG tablet Take 1 tablet by mouth Daily.      diphenhydrAMINE (BENADRYL) 25 MG tablet Take 2 tablets by mouth At Night As Needed for Itching or Sleep.      docusate sodium (COLACE) 250 MG capsule Take 240 mg by mouth Daily.      Magnesium 100 MG capsule Take 100 mg by mouth.      Multiple Vitamin (MULTI VITAMIN MENS) tablet Take 1 tablet by mouth Daily.      metroNIDAZOLE (METROGEL) 0.75 % gel Apply  topically to the appropriate area as directed 2 (Two) Times a Day. 45 g 2    simvastatin (ZOCOR) 20 MG tablet Take 1 tablet by mouth Every Night. 90 tablet 1     No facility-administered medications prior to visit.       No opioid medication identified on active medication list. I have reviewed chart for other potential  high risk medication/s and harmful drug interactions in the elderly.        Aspirin is on active medication list. Aspirin use is indicated based on review of current medical condition/s. Pros and cons of this therapy have been discussed today. Benefits of this medication outweigh potential  "harm.  Patient has been encouraged to continue taking this medication.  .      Patient Active Problem List   Diagnosis    Chronic coronary artery disease    Diverticulosis of sigmoid colon    Hyperlipidemia    Internal hemorrhoids    Rosacea    Thrombocytopenia    Postural kyphosis of thoracic region     Advance Care Planning   Advance Care Planning     Advance Directive is on file.  ACP discussion was held with the patient during this visit. Patient has an advance directive in EMR which is still valid.      Objective    Vitals:    23 0806   BP: 124/86   BP Location: Left arm   Patient Position: Sitting   Cuff Size: Adult   Pulse: 67   Temp: 97.8 °F (36.6 °C)   TempSrc: Temporal   SpO2: 100%   Weight: 74.4 kg (164 lb)   Height: 177.8 cm (70\")     Estimated body mass index is 23.53 kg/m² as calculated from the following:    Height as of this encounter: 177.8 cm (70\").    Weight as of this encounter: 74.4 kg (164 lb).    BMI is within normal parameters. No other follow-up for BMI required.      Does the patient have evidence of cognitive impairment?   No    Lab Results   Component Value Date    CHLPL 144 2023    TRIG 51 2023    HDL 66 (H) 2023    LDL 67 2023    VLDL 11 2023          HEALTH RISK ASSESSMENT    Smoking Status:  Social History     Tobacco Use   Smoking Status Never   Smokeless Tobacco Former    Types: Chew    Quit date: 2000     Alcohol Consumption:  Social History     Substance and Sexual Activity   Alcohol Use No     Fall Risk Screen:    [place] Fall Risk Assessment was completed, and patient is at LOW risk for falls.Assessment completed on:2023    Depression Screenin/21/2023     8:08 AM   PHQ-2/PHQ-9 Depression Screening   Little Interest or Pleasure in Doing Things 0-->not at all   Feeling Down, Depressed or Hopeless 0-->not at all   PHQ-9: Brief Depression Severity Measure Score 0       Health Habits and Functional and Cognitive Screening:      " 9/21/2023     8:07 AM   Functional & Cognitive Status   Do you have difficulty preparing food and eating? No   Do you have difficulty bathing yourself, getting dressed or grooming yourself? No   Do you have difficulty using the toilet? No   Do you have difficulty moving around from place to place? No   Do you have trouble with steps or getting out of a bed or a chair? No   Current Diet Well Balanced Diet   Dental Exam Up to date   Eye Exam Up to date   Exercise (times per week) 2 times per week   Current Exercises Include Walking;Yard Work;House Cleaning   Do you need help using the phone?  No   Are you deaf or do you have serious difficulty hearing?  No   Do you need help to go to places out of walking distance? No   Do you need help shopping? No   Do you need help preparing meals?  No   Do you need help with housework?  No   Do you need help with laundry? No   Do you need help taking your medications? No   Do you need help managing money? No   Do you ever drive or ride in a car without wearing a seat belt? No   Have you felt unusual stress, anger or loneliness in the last month? No   Who do you live with? Alone   If you need help, do you have trouble finding someone available to you? No   Have you been bothered in the last four weeks by sexual problems? No   Do you have difficulty concentrating, remembering or making decisions? No       Age-appropriate Screening Schedule:  Refer to the list below for future screening recommendations based on patient's age, sex and/or medical conditions. Orders for these recommended tests are listed in the plan section. The patient has been provided with a written plan.    Health Maintenance   Topic Date Due    COVID-19 Vaccine (5 - Moderna series) 01/27/2023    ANNUAL WELLNESS VISIT  09/15/2023    INFLUENZA VACCINE  10/01/2023    LIPID PANEL  09/13/2024    COLORECTAL CANCER SCREENING  10/01/2025    TDAP/TD VACCINES (4 - Td or Tdap) 10/08/2029    HEPATITIS C SCREENING  Completed     Pneumococcal Vaccine 65+  Completed    AAA SCREEN (ONE-TIME)  Completed    ZOSTER VACCINE  Completed                  CMS Preventative Services Quick Reference  Risk Factors Identified During Encounter:    Immunizations Discussed/Encouraged: Influenza and COVID19. Patient plans to update these in the next few weeks.     Age appropriate screenings were discussed with patient today. He is up to date on metabolic screenings and I discussed those results with him today. He is up to date on colonoscopy and psa.     The above risks/problems have been discussed with the patient.  Pertinent information has been shared with the patient in the After Visit Summary.    Diagnoses and all orders for this visit:    1. Chronic coronary artery disease (Primary)  -     simvastatin (ZOCOR) 20 MG tablet; Take 1 tablet by mouth Every Night.  Dispense: 90 tablet; Refill: 1  -     CBC (No Diff); Future  -     Comprehensive Metabolic Panel; Future  -     Lipid Panel; Future  -     TSH; Future  -     CK; Future    2. Mixed hyperlipidemia  -     simvastatin (ZOCOR) 20 MG tablet; Take 1 tablet by mouth Every Night.  Dispense: 90 tablet; Refill: 1  -     CBC (No Diff); Future  -     Comprehensive Metabolic Panel; Future  -     Lipid Panel; Future  -     TSH; Future  -     CK; Future    3. Rosacea  -     metroNIDAZOLE (METROGEL) 0.75 % gel; Apply  topically to the appropriate area as directed 2 (Two) Times a Day.  Dispense: 45 g; Refill: 5    4. Prostate cancer screening  -     PSA Screen; Future        Follow Up:   Next Medicare Wellness visit to be scheduled in 1 year.  We will plan on a 6 month office follow up with labs prior.     An After Visit Summary and PPPS were made available to the patient.        This document has been electronically signed by Marilin Ward DO  September 21, 2023 08:48 EDT,

## 2023-10-03 NOTE — PROGRESS NOTES
Destruction, Benign Lesion    Date/Time: 10/3/2023 2:40 PM    Performed by: Michelle Herron FNP  Authorized by: Michelle Herron FNP    Consent Done?:  Yes (Written)  Pre-Procedure:     Timeout: prior to procedure the correct patient, procedure, and site was verified      Local anesthesia used?: No    Indications:     other  Location:     Head/Neck:  Ear    Detail:  Right ear    Upper Extremity:  Hand    Detail:  Right hand  Prep:     Position:  Supine  Procedure Details:     Cosmetic?: No      Number of lesions:  2    Destruction method:  Cryotherapy    Bleeding:  None     Patient tolerated the procedure well with no immediate complications.     Venipuncture right arm pt tolerated well with no complaints. Geetha Sanchez MA

## 2024-03-14 ENCOUNTER — CLINICAL SUPPORT (OUTPATIENT)
Dept: FAMILY MEDICINE CLINIC | Facility: CLINIC | Age: 74
End: 2024-03-14
Payer: MEDICARE

## 2024-03-14 DIAGNOSIS — I25.10 CHRONIC CORONARY ARTERY DISEASE: Chronic | ICD-10-CM

## 2024-03-14 DIAGNOSIS — Z12.5 PROSTATE CANCER SCREENING: ICD-10-CM

## 2024-03-14 DIAGNOSIS — E78.2 MIXED HYPERLIPIDEMIA: ICD-10-CM

## 2024-03-14 PROCEDURE — 36415 COLL VENOUS BLD VENIPUNCTURE: CPT | Performed by: INTERNAL MEDICINE

## 2024-03-14 NOTE — PROGRESS NOTES
Venipuncture Blood Specimen Collection  Venipuncture performed in right arm by Leslie Morales MA with good hemostasis. Patient tolerated the procedure well without complications.   03/14/24   Leslie Morales MA

## 2024-03-15 LAB
ALBUMIN SERPL-MCNC: 4.5 G/DL (ref 3.5–5.2)
ALBUMIN/GLOB SERPL: 2 G/DL
ALP SERPL-CCNC: 58 U/L (ref 39–117)
ALT SERPL-CCNC: 19 U/L (ref 1–41)
AST SERPL-CCNC: 23 U/L (ref 1–40)
BILIRUB SERPL-MCNC: 0.6 MG/DL (ref 0–1.2)
BUN SERPL-MCNC: 20 MG/DL (ref 8–23)
BUN/CREAT SERPL: 16.7 (ref 7–25)
CALCIUM SERPL-MCNC: 9.2 MG/DL (ref 8.6–10.5)
CHLORIDE SERPL-SCNC: 107 MMOL/L (ref 98–107)
CHOLEST SERPL-MCNC: 145 MG/DL (ref 0–200)
CK SERPL-CCNC: 128 U/L (ref 20–200)
CO2 SERPL-SCNC: 25.7 MMOL/L (ref 22–29)
CREAT SERPL-MCNC: 1.2 MG/DL (ref 0.76–1.27)
ERYTHROCYTE [DISTWIDTH] IN BLOOD BY AUTOMATED COUNT: 12.2 % (ref 12.3–15.4)
GLOBULIN SER CALC-MCNC: 2.2 GM/DL
GLUCOSE SERPL-MCNC: 78 MG/DL (ref 65–99)
HCT VFR BLD AUTO: 39.2 % (ref 37.5–51)
HDLC SERPL-MCNC: 72 MG/DL (ref 40–60)
HGB BLD-MCNC: 13 G/DL (ref 13–17.7)
LDLC SERPL CALC-MCNC: 62 MG/DL (ref 0–100)
MCH RBC QN AUTO: 32.1 PG (ref 26.6–33)
MCHC RBC AUTO-ENTMCNC: 33.2 G/DL (ref 31.5–35.7)
MCV RBC AUTO: 96.8 FL (ref 79–97)
PLATELET # BLD AUTO: ABNORMAL 10*3/MM3
POTASSIUM SERPL-SCNC: 4.2 MMOL/L (ref 3.5–5.2)
PROT SERPL-MCNC: 6.7 G/DL (ref 6–8.5)
PSA SERPL-MCNC: 1.98 NG/ML (ref 0–4)
RBC # BLD AUTO: 4.05 10*6/MM3 (ref 4.14–5.8)
SODIUM SERPL-SCNC: 142 MMOL/L (ref 136–145)
TRIGL SERPL-MCNC: 52 MG/DL (ref 0–150)
TSH SERPL DL<=0.005 MIU/L-ACNC: 1.69 UIU/ML (ref 0.27–4.2)
VLDLC SERPL CALC-MCNC: 11 MG/DL (ref 5–40)
WBC # BLD AUTO: 5.53 10*3/MM3 (ref 3.4–10.8)

## 2024-03-21 ENCOUNTER — OFFICE VISIT (OUTPATIENT)
Dept: FAMILY MEDICINE CLINIC | Facility: CLINIC | Age: 74
End: 2024-03-21
Payer: MEDICARE

## 2024-03-21 VITALS
WEIGHT: 169 LBS | HEIGHT: 70 IN | SYSTOLIC BLOOD PRESSURE: 136 MMHG | TEMPERATURE: 97.3 F | OXYGEN SATURATION: 97 % | BODY MASS INDEX: 24.2 KG/M2 | HEART RATE: 82 BPM | DIASTOLIC BLOOD PRESSURE: 80 MMHG

## 2024-03-21 DIAGNOSIS — E78.2 MIXED HYPERLIPIDEMIA: ICD-10-CM

## 2024-03-21 DIAGNOSIS — I25.10 CHRONIC CORONARY ARTERY DISEASE: Primary | Chronic | ICD-10-CM

## 2024-03-21 DIAGNOSIS — D69.6 THROMBOCYTOPENIA: Chronic | ICD-10-CM

## 2024-03-21 PROCEDURE — 1160F RVW MEDS BY RX/DR IN RCRD: CPT | Performed by: INTERNAL MEDICINE

## 2024-03-21 PROCEDURE — 99214 OFFICE O/P EST MOD 30 MIN: CPT | Performed by: INTERNAL MEDICINE

## 2024-03-21 PROCEDURE — 1159F MED LIST DOCD IN RCRD: CPT | Performed by: INTERNAL MEDICINE

## 2024-03-21 RX ORDER — SIMVASTATIN 20 MG
20 TABLET ORAL NIGHTLY
Qty: 90 TABLET | Refills: 1 | Status: SHIPPED | OUTPATIENT
Start: 2024-03-21

## 2024-03-21 NOTE — PROGRESS NOTES
Patient Name: Jem Crowder Today's Date: 3/21/2024   Patient MRN / CSN: [id number] / [id number] Date of Encounter: 3/21/2024   Patient Age / : 73 y.o. / 1950 Encounter Provider: Marilin Ward DO   Referring Physician: No ref. provider found          Jem is a 73 y.o. male who is being seen today for Follow-up (Feeling good today, no issues or complaints. ) and Hyperlipidemia      History of Present Illness    Jem presents today for follow-up on coronary artery disease and hyperlipidemia.  He reports doing well with the current regimen.  He denies any recent chest pain or shortness of air.  He denies blood in stool or urine.  He is tolerating his current medicine well.    Allergies include:Penicillins  Current Outpatient Medications   Medication Sig Dispense Refill    aspirin 81 MG tablet Take 1 tablet by mouth Daily.      diphenhydrAMINE (BENADRYL) 25 MG tablet Take 2 tablets by mouth At Night As Needed for Itching or Sleep.      docusate sodium (COLACE) 250 MG capsule Take 240 mg by mouth Daily.      Magnesium 100 MG capsule Take 100 mg by mouth.      metroNIDAZOLE (METROGEL) 0.75 % gel Apply  topically to the appropriate area as directed 2 (Two) Times a Day. 45 g 5    Multiple Vitamin (MULTI VITAMIN MENS) tablet Take 1 tablet by mouth Daily.      simvastatin (ZOCOR) 20 MG tablet Take 1 tablet by mouth Every Night. 90 tablet 1     No current facility-administered medications for this visit.     Past Medical History:   Diagnosis Date    Coronary artery disease     Diverticulitis     Rosacea      Family History   Problem Relation Age of Onset    Heart disease Mother     Hypertension Mother     Lung disease Father         Black Lung    Stomach cancer Father     Cancer Brother      Past Surgical History:   Procedure Laterality Date    CATARACT EXTRACTION Left 2019    COLONOSCOPY  10/01/2015     Social History     Substance and Sexual Activity   Alcohol Use No     Social History     Tobacco Use  "  Smoking Status Never   Smokeless Tobacco Former    Types: Chew    Quit date: 7/21/2000     Social History     Substance and Sexual Activity   Drug Use No     Review of Systems   Respiratory:  Negative for shortness of breath.    Cardiovascular:  Negative for chest pain.   Gastrointestinal:  Negative for blood in stool.   Genitourinary:  Negative for hematuria.        Depression Assessment Review:  PHQ-9 Total Score: 0  Vital Signs & Measurements Taken This Encounter  /80 (BP Location: Right arm, Patient Position: Sitting, Cuff Size: Adult)   Pulse 82   Temp 97.3 °F (36.3 °C) (Temporal)   Ht 177.8 cm (70\")   Wt 76.7 kg (169 lb)   SpO2 97%   BMI 24.25 kg/m²    SpO2 Percentage    03/21/24 0832   SpO2: 97%        BMI is within normal parameters. No other follow-up for BMI required.      Physical Exam  Vitals reviewed.   Constitutional:       General: He is not in acute distress.  HENT:      Head: Normocephalic and atraumatic.   Eyes:      General: No scleral icterus.     Extraocular Movements: Extraocular movements intact.      Conjunctiva/sclera: Conjunctivae normal.      Pupils: Pupils are equal, round, and reactive to light.   Cardiovascular:      Rate and Rhythm: Normal rate and regular rhythm.   Pulmonary:      Effort: Pulmonary effort is normal. No respiratory distress.      Breath sounds: Normal breath sounds. No wheezing or rhonchi.   Abdominal:      Palpations: Abdomen is soft.      Tenderness: There is no abdominal tenderness.   Musculoskeletal:         General: No swelling.      Cervical back: Neck supple. No tenderness.   Lymphadenopathy:      Cervical: No cervical adenopathy.   Skin:     General: Skin is warm and dry.      Coloration: Skin is not jaundiced.   Neurological:      Mental Status: He is alert.   Psychiatric:         Mood and Affect: Mood normal.         Behavior: Behavior normal.         Thought Content: Thought content normal.         Judgment: Judgment normal.        "       Assessment & Plan  Patient Active Problem List   Diagnosis    Chronic coronary artery disease    Diverticulosis of sigmoid colon    Hyperlipidemia    Internal hemorrhoids    Rosacea    Thrombocytopenia    Postural kyphosis of thoracic region       ICD-10-CM ICD-9-CM   1. Chronic coronary artery disease  I25.10 414.00   2. Mixed hyperlipidemia  E78.2 272.2   3. Thrombocytopenia  D69.6 287.5     Orders Placed This Encounter   Procedures    Comprehensive Metabolic Panel     Standing Status:   Future     Standing Expiration Date:   3/21/2025     Order Specific Question:   Release to patient     Answer:   Routine Release [[id number]]    Lipid Panel     Standing Status:   Future     Standing Expiration Date:   3/21/2025     Order Specific Question:   Release to patient     Answer:   Routine Release [[id number]]    TSH     Standing Status:   Future     Standing Expiration Date:   3/21/2025     Order Specific Question:   Release to patient     Answer:   Routine Release [[id number]]    CK     Standing Status:   Future     Standing Expiration Date:   3/21/2025     Order Specific Question:   Release to patient     Answer:   Routine Release [[id number]]    CBC & Differential     Standing Status:   Future     Standing Expiration Date:   3/21/2025     Order Specific Question:   Manual Differential     Answer:   No     Order Specific Question:   Release to patient     Answer:   Routine Release [[id number]]       Meds Ordered During Visit:  New Medications Ordered This Visit   Medications    simvastatin (ZOCOR) 20 MG tablet     Sig: Take 1 tablet by mouth Every Night.     Dispense:  90 tablet     Refill:  1     I reviewed recent labs with patient today.  Clinically, Jem appears to be doing very well.  We will continue current medicine regimen at this time.  Will plan to update labs as above prior to next appointment.      Return in about 6 months (around 9/21/2024), or if symptoms worsen or fail to improve, for Recheck,  Medicare Wellness, Labs Prior.          Referring Provider (if known): No ref. provider found      This document has been electronically signed by Marilin Ward DO  March 21, 2024 09:44 EDT    Marilin Ward DO, FACOI  [address] [place] [address]  [phone] (office)    Part of this note may be an electronic transcription/translation of spoken language to printed text using the Dragon Dictation System.

## 2024-09-17 DIAGNOSIS — E78.2 MIXED HYPERLIPIDEMIA: ICD-10-CM

## 2024-09-17 DIAGNOSIS — I25.10 CHRONIC CORONARY ARTERY DISEASE: Chronic | ICD-10-CM

## 2024-09-17 RX ORDER — SIMVASTATIN 20 MG
20 TABLET ORAL NIGHTLY
Qty: 90 TABLET | Refills: 1 | Status: SHIPPED | OUTPATIENT
Start: 2024-09-17

## 2024-09-19 ENCOUNTER — CLINICAL SUPPORT (OUTPATIENT)
Dept: FAMILY MEDICINE CLINIC | Facility: CLINIC | Age: 74
End: 2024-09-19
Payer: MEDICARE

## 2024-09-19 DIAGNOSIS — D69.6 THROMBOCYTOPENIA: ICD-10-CM

## 2024-09-19 DIAGNOSIS — I25.10 CHRONIC CORONARY ARTERY DISEASE: ICD-10-CM

## 2024-09-19 DIAGNOSIS — E78.2 MIXED HYPERLIPIDEMIA: ICD-10-CM

## 2024-09-19 LAB
ALBUMIN SERPL-MCNC: 4.6 G/DL (ref 3.5–5.2)
ALBUMIN/GLOB SERPL: 2.2 G/DL
ALP SERPL-CCNC: 53 U/L (ref 39–117)
ALT SERPL W P-5'-P-CCNC: 21 U/L (ref 1–41)
ANION GAP SERPL CALCULATED.3IONS-SCNC: 8.3 MMOL/L (ref 5–15)
AST SERPL-CCNC: 26 U/L (ref 1–40)
BASOPHILS # BLD AUTO: 0.03 10*3/MM3 (ref 0–0.2)
BASOPHILS NFR BLD AUTO: 0.5 % (ref 0–1.5)
BILIRUB SERPL-MCNC: 0.5 MG/DL (ref 0–1.2)
BUN SERPL-MCNC: 20 MG/DL (ref 8–23)
BUN/CREAT SERPL: 17.5 (ref 7–25)
CALCIUM SPEC-SCNC: 9.5 MG/DL (ref 8.6–10.5)
CHLORIDE SERPL-SCNC: 106 MMOL/L (ref 98–107)
CHOLEST SERPL-MCNC: 157 MG/DL (ref 0–200)
CK SERPL-CCNC: 118 U/L (ref 20–200)
CO2 SERPL-SCNC: 28.7 MMOL/L (ref 22–29)
CREAT SERPL-MCNC: 1.14 MG/DL (ref 0.76–1.27)
DEPRECATED RDW RBC AUTO: 46.8 FL (ref 37–54)
EGFRCR SERPLBLD CKD-EPI 2021: 67.9 ML/MIN/1.73
EOSINOPHIL # BLD AUTO: 0.25 10*3/MM3 (ref 0–0.4)
EOSINOPHIL NFR BLD AUTO: 4.2 % (ref 0.3–6.2)
ERYTHROCYTE [DISTWIDTH] IN BLOOD BY AUTOMATED COUNT: 12.8 % (ref 12.3–15.4)
GLOBULIN UR ELPH-MCNC: 2.1 GM/DL
GLUCOSE SERPL-MCNC: 81 MG/DL (ref 65–99)
HCT VFR BLD AUTO: 41.4 % (ref 37.5–51)
HDLC SERPL-MCNC: 75 MG/DL (ref 40–60)
HGB BLD-MCNC: 13.4 G/DL (ref 13–17.7)
IMM GRANULOCYTES # BLD AUTO: 0.01 10*3/MM3 (ref 0–0.05)
IMM GRANULOCYTES NFR BLD AUTO: 0.2 % (ref 0–0.5)
LDLC SERPL CALC-MCNC: 70 MG/DL (ref 0–100)
LDLC/HDLC SERPL: 0.94 {RATIO}
LYMPHOCYTES # BLD AUTO: 1.85 10*3/MM3 (ref 0.7–3.1)
LYMPHOCYTES NFR BLD AUTO: 31.4 % (ref 19.6–45.3)
MCH RBC QN AUTO: 31.9 PG (ref 26.6–33)
MCHC RBC AUTO-ENTMCNC: 32.4 G/DL (ref 31.5–35.7)
MCV RBC AUTO: 98.6 FL (ref 79–97)
MONOCYTES # BLD AUTO: 0.41 10*3/MM3 (ref 0.1–0.9)
MONOCYTES NFR BLD AUTO: 7 % (ref 5–12)
NEUTROPHILS NFR BLD AUTO: 3.34 10*3/MM3 (ref 1.7–7)
NEUTROPHILS NFR BLD AUTO: 56.7 % (ref 42.7–76)
NRBC BLD AUTO-RTO: 0 /100 WBC (ref 0–0.2)
PLATELET # BLD AUTO: 121 10*3/MM3 (ref 140–450)
PMV BLD AUTO: 12.4 FL (ref 6–12)
POTASSIUM SERPL-SCNC: 4.2 MMOL/L (ref 3.5–5.2)
PROT SERPL-MCNC: 6.7 G/DL (ref 6–8.5)
RBC # BLD AUTO: 4.2 10*6/MM3 (ref 4.14–5.8)
SODIUM SERPL-SCNC: 143 MMOL/L (ref 136–145)
TRIGL SERPL-MCNC: 57 MG/DL (ref 0–150)
TSH SERPL DL<=0.05 MIU/L-ACNC: 1.95 UIU/ML (ref 0.27–4.2)
VLDLC SERPL-MCNC: 12 MG/DL (ref 5–40)
WBC NRBC COR # BLD AUTO: 5.89 10*3/MM3 (ref 3.4–10.8)

## 2024-09-19 PROCEDURE — 80053 COMPREHEN METABOLIC PANEL: CPT | Performed by: INTERNAL MEDICINE

## 2024-09-19 PROCEDURE — 82550 ASSAY OF CK (CPK): CPT | Performed by: INTERNAL MEDICINE

## 2024-09-19 PROCEDURE — 84443 ASSAY THYROID STIM HORMONE: CPT | Performed by: INTERNAL MEDICINE

## 2024-09-19 PROCEDURE — 85025 COMPLETE CBC W/AUTO DIFF WBC: CPT | Performed by: INTERNAL MEDICINE

## 2024-09-19 PROCEDURE — 80061 LIPID PANEL: CPT | Performed by: INTERNAL MEDICINE

## 2024-09-19 PROCEDURE — 36415 COLL VENOUS BLD VENIPUNCTURE: CPT | Performed by: INTERNAL MEDICINE

## 2024-09-19 PROCEDURE — 36415 COLL VENOUS BLD VENIPUNCTURE: CPT

## 2024-09-24 ENCOUNTER — OFFICE VISIT (OUTPATIENT)
Dept: FAMILY MEDICINE CLINIC | Facility: CLINIC | Age: 74
End: 2024-09-24
Payer: MEDICARE

## 2024-09-24 VITALS
TEMPERATURE: 97.9 F | WEIGHT: 169 LBS | HEIGHT: 70 IN | BODY MASS INDEX: 24.2 KG/M2 | OXYGEN SATURATION: 100 % | SYSTOLIC BLOOD PRESSURE: 128 MMHG | DIASTOLIC BLOOD PRESSURE: 72 MMHG | HEART RATE: 54 BPM

## 2024-09-24 DIAGNOSIS — Z00.00 HEALTH CARE MAINTENANCE: ICD-10-CM

## 2024-09-24 DIAGNOSIS — D69.6 THROMBOCYTOPENIA: Chronic | ICD-10-CM

## 2024-09-24 DIAGNOSIS — E78.2 MIXED HYPERLIPIDEMIA: Chronic | ICD-10-CM

## 2024-09-24 DIAGNOSIS — Z12.5 PROSTATE CANCER SCREENING: ICD-10-CM

## 2024-09-24 DIAGNOSIS — Z00.00 ENCOUNTER FOR WELLNESS EXAMINATION: Primary | ICD-10-CM

## 2024-09-24 PROCEDURE — 1160F RVW MEDS BY RX/DR IN RCRD: CPT | Performed by: INTERNAL MEDICINE

## 2024-09-24 PROCEDURE — 1126F AMNT PAIN NOTED NONE PRSNT: CPT | Performed by: INTERNAL MEDICINE

## 2024-09-24 PROCEDURE — G0439 PPPS, SUBSEQ VISIT: HCPCS | Performed by: INTERNAL MEDICINE

## 2024-09-24 PROCEDURE — 1170F FXNL STATUS ASSESSED: CPT | Performed by: INTERNAL MEDICINE

## 2024-09-24 PROCEDURE — 96160 PT-FOCUSED HLTH RISK ASSMT: CPT | Performed by: INTERNAL MEDICINE

## 2024-09-24 PROCEDURE — 1159F MED LIST DOCD IN RCRD: CPT | Performed by: INTERNAL MEDICINE

## 2024-10-10 ENCOUNTER — FLU SHOT (OUTPATIENT)
Dept: FAMILY MEDICINE CLINIC | Facility: CLINIC | Age: 74
End: 2024-10-10
Payer: MEDICARE

## 2024-10-10 DIAGNOSIS — Z23 NEED FOR IMMUNIZATION AGAINST INFLUENZA: Primary | ICD-10-CM

## 2024-10-10 PROCEDURE — G0008 ADMIN INFLUENZA VIRUS VAC: HCPCS | Performed by: INTERNAL MEDICINE

## 2024-10-10 PROCEDURE — 90662 IIV NO PRSV INCREASED AG IM: CPT | Performed by: INTERNAL MEDICINE

## 2024-10-10 NOTE — PROGRESS NOTES
Immunization  Immunization performed in right deltoid by Giselle Sosa MA. Patient tolerated the procedure well without complications.  10/10/24   Giselle Sosa MA

## 2025-03-20 ENCOUNTER — CLINICAL SUPPORT (OUTPATIENT)
Dept: FAMILY MEDICINE CLINIC | Facility: CLINIC | Age: 75
End: 2025-03-20
Payer: MEDICARE

## 2025-03-20 DIAGNOSIS — Z12.5 PROSTATE CANCER SCREENING: ICD-10-CM

## 2025-03-20 DIAGNOSIS — E78.2 MIXED HYPERLIPIDEMIA: Chronic | ICD-10-CM

## 2025-03-20 DIAGNOSIS — E78.2 MIXED HYPERLIPIDEMIA: ICD-10-CM

## 2025-03-20 DIAGNOSIS — I25.10 CHRONIC CORONARY ARTERY DISEASE: Chronic | ICD-10-CM

## 2025-03-20 DIAGNOSIS — D69.6 THROMBOCYTOPENIA: Chronic | ICD-10-CM

## 2025-03-20 LAB
ALBUMIN SERPL-MCNC: 4.5 G/DL (ref 3.5–5.2)
ALBUMIN/GLOB SERPL: 1.7 G/DL
ALP SERPL-CCNC: 56 U/L (ref 39–117)
ALT SERPL W P-5'-P-CCNC: 22 U/L (ref 1–41)
ANION GAP SERPL CALCULATED.3IONS-SCNC: 12 MMOL/L (ref 5–15)
AST SERPL-CCNC: 31 U/L (ref 1–40)
BILIRUB SERPL-MCNC: 0.5 MG/DL (ref 0–1.2)
BUN SERPL-MCNC: 23 MG/DL (ref 8–23)
BUN/CREAT SERPL: 18.9 (ref 7–25)
CALCIUM SPEC-SCNC: 9.3 MG/DL (ref 8.6–10.5)
CHLORIDE SERPL-SCNC: 107 MMOL/L (ref 98–107)
CHOLEST SERPL-MCNC: 149 MG/DL (ref 0–200)
CO2 SERPL-SCNC: 27 MMOL/L (ref 22–29)
CREAT SERPL-MCNC: 1.22 MG/DL (ref 0.76–1.27)
DEPRECATED RDW RBC AUTO: 44 FL (ref 37–54)
EGFRCR SERPLBLD CKD-EPI 2021: 62.6 ML/MIN/1.73
ERYTHROCYTE [DISTWIDTH] IN BLOOD BY AUTOMATED COUNT: 12.3 % (ref 12.3–15.4)
GLOBULIN UR ELPH-MCNC: 2.7 GM/DL
GLUCOSE SERPL-MCNC: 81 MG/DL (ref 65–99)
HCT VFR BLD AUTO: 39 % (ref 37.5–51)
HDLC SERPL-MCNC: 70 MG/DL (ref 40–60)
HGB BLD-MCNC: 13.1 G/DL (ref 13–17.7)
LDLC SERPL CALC-MCNC: 69 MG/DL (ref 0–100)
LDLC/HDLC SERPL: 1 {RATIO}
MCH RBC QN AUTO: 32.7 PG (ref 26.6–33)
MCHC RBC AUTO-ENTMCNC: 33.6 G/DL (ref 31.5–35.7)
MCV RBC AUTO: 97.3 FL (ref 79–97)
PLATELET # BLD AUTO: 69 10*3/MM3 (ref 140–450)
PMV BLD AUTO: 12.2 FL (ref 6–12)
POTASSIUM SERPL-SCNC: 4.2 MMOL/L (ref 3.5–5.2)
PROT SERPL-MCNC: 7.2 G/DL (ref 6–8.5)
PSA SERPL-MCNC: 2.42 NG/ML (ref 0–4)
RBC # BLD AUTO: 4.01 10*6/MM3 (ref 4.14–5.8)
SODIUM SERPL-SCNC: 146 MMOL/L (ref 136–145)
TRIGL SERPL-MCNC: 45 MG/DL (ref 0–150)
TSH SERPL DL<=0.05 MIU/L-ACNC: 1.55 UIU/ML (ref 0.27–4.2)
VLDLC SERPL-MCNC: 10 MG/DL (ref 5–40)
WBC NRBC COR # BLD AUTO: 5.68 10*3/MM3 (ref 3.4–10.8)

## 2025-03-20 PROCEDURE — 80061 LIPID PANEL: CPT | Performed by: INTERNAL MEDICINE

## 2025-03-20 PROCEDURE — 80053 COMPREHEN METABOLIC PANEL: CPT | Performed by: INTERNAL MEDICINE

## 2025-03-20 PROCEDURE — 84443 ASSAY THYROID STIM HORMONE: CPT | Performed by: INTERNAL MEDICINE

## 2025-03-20 PROCEDURE — 36415 COLL VENOUS BLD VENIPUNCTURE: CPT | Performed by: INTERNAL MEDICINE

## 2025-03-20 PROCEDURE — 85027 COMPLETE CBC AUTOMATED: CPT | Performed by: INTERNAL MEDICINE

## 2025-03-20 PROCEDURE — G0103 PSA SCREENING: HCPCS | Performed by: INTERNAL MEDICINE

## 2025-03-20 RX ORDER — SIMVASTATIN 20 MG
20 TABLET ORAL NIGHTLY
Qty: 90 TABLET | Refills: 1 | Status: SHIPPED | OUTPATIENT
Start: 2025-03-20 | End: 2025-03-24 | Stop reason: SDUPTHER

## 2025-03-20 NOTE — PROGRESS NOTES
Venipuncture Blood Specimen Collection  Venipuncture performed in left arm by Leslie Morales MA with good hemostasis. Patient tolerated the procedure well without complications.   03/20/25   Leslie Morales MA

## 2025-03-24 ENCOUNTER — PREP FOR SURGERY (OUTPATIENT)
Dept: OTHER | Facility: HOSPITAL | Age: 75
End: 2025-03-24
Payer: MEDICARE

## 2025-03-24 ENCOUNTER — OFFICE VISIT (OUTPATIENT)
Dept: FAMILY MEDICINE CLINIC | Facility: CLINIC | Age: 75
End: 2025-03-24
Payer: MEDICARE

## 2025-03-24 ENCOUNTER — LAB (OUTPATIENT)
Dept: LAB | Facility: HOSPITAL | Age: 75
End: 2025-03-24
Payer: MEDICARE

## 2025-03-24 VITALS
BODY MASS INDEX: 25.2 KG/M2 | DIASTOLIC BLOOD PRESSURE: 78 MMHG | WEIGHT: 176 LBS | OXYGEN SATURATION: 100 % | SYSTOLIC BLOOD PRESSURE: 138 MMHG | HEIGHT: 70 IN | HEART RATE: 63 BPM | TEMPERATURE: 98.4 F

## 2025-03-24 DIAGNOSIS — Z12.11 ENCOUNTER FOR SCREENING FOR MALIGNANT NEOPLASM OF COLON: Primary | ICD-10-CM

## 2025-03-24 DIAGNOSIS — D69.6 THROMBOCYTOPENIA: Chronic | ICD-10-CM

## 2025-03-24 DIAGNOSIS — L30.9 DERMATITIS: ICD-10-CM

## 2025-03-24 DIAGNOSIS — E78.2 MIXED HYPERLIPIDEMIA: Primary | ICD-10-CM

## 2025-03-24 DIAGNOSIS — D69.6 PLATELETS DECREASED: ICD-10-CM

## 2025-03-24 DIAGNOSIS — Z00.00 HEALTH CARE MAINTENANCE: ICD-10-CM

## 2025-03-24 DIAGNOSIS — E78.2 MIXED HYPERLIPIDEMIA: ICD-10-CM

## 2025-03-24 DIAGNOSIS — Z12.11 COLON CANCER SCREENING: ICD-10-CM

## 2025-03-24 DIAGNOSIS — L71.9 ROSACEA: ICD-10-CM

## 2025-03-24 LAB
ALBUMIN SERPL-MCNC: 4.5 G/DL (ref 3.5–5.2)
ALBUMIN/GLOB SERPL: 1.6 G/DL
ALP SERPL-CCNC: 55 U/L (ref 39–117)
ALT SERPL W P-5'-P-CCNC: 23 U/L (ref 1–41)
ANION GAP SERPL CALCULATED.3IONS-SCNC: 12 MMOL/L (ref 5–15)
AST SERPL-CCNC: 29 U/L (ref 1–40)
BILIRUB SERPL-MCNC: 0.4 MG/DL (ref 0–1.2)
BUN SERPL-MCNC: 16 MG/DL (ref 8–23)
BUN/CREAT SERPL: 13 (ref 7–25)
CALCIUM SPEC-SCNC: 9.7 MG/DL (ref 8.6–10.5)
CHLORIDE SERPL-SCNC: 104 MMOL/L (ref 98–107)
CHOLEST SERPL-MCNC: 147 MG/DL (ref 0–200)
CK SERPL-CCNC: 101 U/L (ref 20–200)
CO2 SERPL-SCNC: 26 MMOL/L (ref 22–29)
CREAT SERPL-MCNC: 1.23 MG/DL (ref 0.76–1.27)
DEPRECATED RDW RBC AUTO: 46.1 FL (ref 37–54)
EGFRCR SERPLBLD CKD-EPI 2021: 62 ML/MIN/1.73
ERYTHROCYTE [DISTWIDTH] IN BLOOD BY AUTOMATED COUNT: 12.5 % (ref 12.3–15.4)
GLOBULIN UR ELPH-MCNC: 2.8 GM/DL
GLUCOSE SERPL-MCNC: 82 MG/DL (ref 65–99)
HCT VFR BLD AUTO: 41 % (ref 37.5–51)
HDLC SERPL-MCNC: 69 MG/DL (ref 40–60)
HGB BLD-MCNC: 13.4 G/DL (ref 13–17.7)
IRON 24H UR-MRATE: 94 MCG/DL (ref 59–158)
IRON SATN MFR SERPL: 23 % (ref 20–50)
LDLC SERPL CALC-MCNC: 65 MG/DL (ref 0–100)
LDLC/HDLC SERPL: 0.95 {RATIO}
MCH RBC QN AUTO: 32.4 PG (ref 26.6–33)
MCHC RBC AUTO-ENTMCNC: 32.7 G/DL (ref 31.5–35.7)
MCV RBC AUTO: 99.3 FL (ref 79–97)
PLATELET # BLD AUTO: ABNORMAL 10*3/UL
PMV BLD AUTO: 10.8 FL (ref 6–12)
POTASSIUM SERPL-SCNC: 4.2 MMOL/L (ref 3.5–5.2)
PROT SERPL-MCNC: 7.3 G/DL (ref 6–8.5)
RBC # BLD AUTO: 4.13 10*6/MM3 (ref 4.14–5.8)
SODIUM SERPL-SCNC: 142 MMOL/L (ref 136–145)
TIBC SERPL-MCNC: 401 MCG/DL (ref 298–536)
TRANSFERRIN SERPL-MCNC: 269 MG/DL (ref 200–360)
TRIGL SERPL-MCNC: 61 MG/DL (ref 0–150)
TSH SERPL DL<=0.05 MIU/L-ACNC: 1.8 UIU/ML (ref 0.27–4.2)
VLDLC SERPL-MCNC: 13 MG/DL (ref 5–40)
WBC NRBC COR # BLD AUTO: 5.73 10*3/MM3 (ref 3.4–10.8)

## 2025-03-24 PROCEDURE — 99214 OFFICE O/P EST MOD 30 MIN: CPT | Performed by: INTERNAL MEDICINE

## 2025-03-24 PROCEDURE — G2211 COMPLEX E/M VISIT ADD ON: HCPCS | Performed by: INTERNAL MEDICINE

## 2025-03-24 PROCEDURE — 1159F MED LIST DOCD IN RCRD: CPT | Performed by: INTERNAL MEDICINE

## 2025-03-24 PROCEDURE — 82607 VITAMIN B-12: CPT

## 2025-03-24 PROCEDURE — 85027 COMPLETE CBC AUTOMATED: CPT

## 2025-03-24 PROCEDURE — 1160F RVW MEDS BY RX/DR IN RCRD: CPT | Performed by: INTERNAL MEDICINE

## 2025-03-24 PROCEDURE — 36415 COLL VENOUS BLD VENIPUNCTURE: CPT

## 2025-03-24 PROCEDURE — 1126F AMNT PAIN NOTED NONE PRSNT: CPT | Performed by: INTERNAL MEDICINE

## 2025-03-24 PROCEDURE — 82746 ASSAY OF FOLIC ACID SERUM: CPT

## 2025-03-24 RX ORDER — TRIAMCINOLONE ACETONIDE 5 MG/G
1 OINTMENT TOPICAL 2 TIMES DAILY PRN
Qty: 60 G | Refills: 5 | Status: SHIPPED | OUTPATIENT
Start: 2025-03-24

## 2025-03-24 RX ORDER — METRONIDAZOLE 7.5 MG/G
GEL TOPICAL 2 TIMES DAILY
Qty: 45 G | Refills: 5 | Status: SHIPPED | OUTPATIENT
Start: 2025-03-24

## 2025-03-24 RX ORDER — SIMVASTATIN 20 MG
20 TABLET ORAL NIGHTLY
Qty: 90 TABLET | Refills: 1 | Status: SHIPPED | OUTPATIENT
Start: 2025-03-24

## 2025-03-24 RX ORDER — BISACODYL 5 MG/1
5 TABLET, DELAYED RELEASE ORAL DAILY PRN
Qty: 4 TABLET | Refills: 0 | Status: SHIPPED | OUTPATIENT
Start: 2025-03-24

## 2025-03-24 RX ORDER — POLYETHYLENE GLYCOL 3350 17 G/17G
17 POWDER, FOR SOLUTION ORAL DAILY
Qty: 510 G | Refills: 0 | Status: SHIPPED | OUTPATIENT
Start: 2025-03-24

## 2025-03-24 NOTE — PROGRESS NOTES
Patient Name: Jem Crowder Today's Date: 3/24/2025   Patient MRN / CSN: [id number] / [id number] Date of Encounter: 3/24/2025   Patient Age / : 74 y.o. / 1950 Encounter Provider: Marilin Ward DO   Referring Physician: No ref. provider found          Jem is a 74 y.o. male who is being seen today for Follow-up (Doing good today. No issues or concerns at this time. ) and Hyperlipidemia      HPI    Jem presents today for a follow-up on hyperlipidemia, rosacea, and thrombocytopenia.  He has been taking aspirin 81 mg daily for years as prescribed many years ago from the previous provider.  He recalls having a heart catheterization many years ago and was told there are multiple things wrong but no stents placed at that time.  He sought a second opinion and was told by that cardiologist that his cardiac catheterization was normal.  He then sought a third opinion from cardiology and was told that everything was normal.  He denies any chest pain or shortness of air.  He has been taking aspirin ever since then but reports easy bleeding if he gets a skin scrape.  He denies blood in stool or urine.  He has battled thrombocytopenia for many years as well and has seen hematology in 2017 regarding this.  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  On his latest labs, his platelet count was 69, decreased from 121 previously.  His LDL has been well-controlled at 69 Mg/DL.  He is doing well with simvastatin.  He reports itchy rash, recurrent on his right ear and some on his right lower leg.  He would like a cream for this.  He continues to use MetroGel for rosacea with relief.      Allergies include:Penicillins  Current Outpatient Medications   Medication Sig Dispense Refill    diphenhydrAMINE (BENADRYL) 25 MG tablet Take 2 tablets by mouth At Night As Needed for Itching or Sleep.      docusate sodium (COLACE) 250 MG capsule Take 240 mg by mouth Daily.      Magnesium 100 MG capsule Take  100 mg by mouth.      metroNIDAZOLE (METROGEL) 0.75 % gel Apply  topically to the appropriate area as directed 2 (Two) Times a Day. 45 g 5    Multiple Vitamin (MULTI VITAMIN MENS) tablet Take 1 tablet by mouth Daily.      simvastatin (ZOCOR) 20 MG tablet Take 1 tablet by mouth Every Night. 90 tablet 1    triamcinolone (KENALOG) 0.5 % ointment Apply 1 Application topically to the appropriate area as directed 2 (Two) Times a Day As Needed for Rash. 60 g 5     No current facility-administered medications for this visit.     Past Medical History:   Diagnosis Date    Coronary artery disease     Diverticulitis     Rosacea      Family History   Problem Relation Age of Onset    Heart disease Mother     Hypertension Mother     Lung disease Father         Black Lung    Stomach cancer Father     Cancer Brother      Past Surgical History:   Procedure Laterality Date    CATARACT EXTRACTION Left 2019    CATARACT EXTRACTION Right 2024    COLONOSCOPY  10/01/2015    EYE SURGERY Right 06/24/2024    REFRACTIVE SURGERY Right 03/10/2025     Social History     Substance and Sexual Activity   Alcohol Use No     Social History     Tobacco Use   Smoking Status Never   Smokeless Tobacco Former    Types: Chew    Quit date: 7/21/2000     Social History     Substance and Sexual Activity   Drug Use No     Review of Systems   HENT:  Negative for nosebleeds.    Respiratory:  Negative for shortness of breath.    Cardiovascular:  Negative for chest pain.        Patient reports having a cardiac catheterization previously and was told there were several things wrong. He requested a second opinion and repeat catheterization was without any abnormalities. He then requested a third opinion in [place] and was told everything was normal. He denies cardiac symptoms.    Gastrointestinal:  Negative for abdominal pain and blood in stool.   Genitourinary:  Negative for hematuria.        Depression Assessment Review:  PHQ-9 Total Score:    Vital Signs &  "Measurements Taken This Encounter  /78 (BP Location: Left arm, Patient Position: Sitting, Cuff Size: Adult)   Pulse 63   Temp 98.4 °F (36.9 °C) (Oral)   Ht 177.8 cm (70\")   Wt 79.8 kg (176 lb)   SpO2 100%   BMI 25.25 kg/m²    SpO2 Percentage    03/24/25 0822   SpO2: 100%        BMI is >= 25 and <30. (Overweight) The following options were offered after discussion;: information on healthy weight added to patient's after visit summary       Physical Exam  Vitals reviewed.   Constitutional:       General: He is not in acute distress.  HENT:      Head: Normocephalic and atraumatic.   Eyes:      General: No scleral icterus.     Extraocular Movements: Extraocular movements intact.      Conjunctiva/sclera: Conjunctivae normal.      Pupils: Pupils are equal, round, and reactive to light.   Cardiovascular:      Rate and Rhythm: Normal rate and regular rhythm.   Pulmonary:      Effort: Pulmonary effort is normal. No respiratory distress.      Breath sounds: Normal breath sounds. No wheezing or rhonchi.   Musculoskeletal:         General: No swelling.      Cervical back: Neck supple. No tenderness.   Lymphadenopathy:      Cervical: No cervical adenopathy.   Skin:     General: Skin is warm and dry.      Coloration: Skin is not jaundiced.      Comments: Erythematous, macular rash on the right external ear and right lower anterior leg.   Neurological:      Mental Status: He is alert.   Psychiatric:         Mood and Affect: Mood normal.         Behavior: Behavior normal.         Thought Content: Thought content normal.         Judgment: Judgment normal.            Assessment & Plan  Patient Active Problem List   Diagnosis    Diverticulosis of sigmoid colon    Hyperlipidemia    Internal hemorrhoids    Rosacea    Thrombocytopenia    Postural kyphosis of thoracic region    Dermatitis       ICD-10-CM ICD-9-CM   1. Mixed hyperlipidemia  E78.2 272.2   2. Rosacea  L71.9 695.3   3. Thrombocytopenia  D69.6 287.5   4. Dermatitis "  L30.9 692.9   5. Health care maintenance  Z00.00 V70.0   6. Colon cancer screening  Z12.11 V76.51   7. Platelets decreased  D69.6 287.5     Diagnoses and all orders for this visit:    1. Mixed hyperlipidemia (Primary)  -     simvastatin (ZOCOR) 20 MG tablet; Take 1 tablet by mouth Every Night.  Dispense: 90 tablet; Refill: 1  -     CBC (No Diff); Future  -     Comprehensive Metabolic Panel; Future  -     Lipid Panel; Future  -     TSH; Future  -     CK; Future    2. Rosacea  -     metroNIDAZOLE (METROGEL) 0.75 % gel; Apply  topically to the appropriate area as directed 2 (Two) Times a Day.  Dispense: 45 g; Refill: 5    3. Thrombocytopenia  -     CBC (No Diff); Future  -     Vitamin B12; Future  -     Folate; Future  -     Iron Profile; Future  -     CBC (No Diff); Future    4. Dermatitis  -     triamcinolone (KENALOG) 0.5 % ointment; Apply 1 Application topically to the appropriate area as directed 2 (Two) Times a Day As Needed for Rash.  Dispense: 60 g; Refill: 5    5. Health care maintenance  -     CBC (No Diff); Future  -     Comprehensive Metabolic Panel; Future  -     Lipid Panel; Future  -     TSH; Future    6. Colon cancer screening  -     Ambulatory Referral to Gastroenterology    7. Platelets decreased         Meds Ordered During Visit:  New Medications Ordered This Visit   Medications    metroNIDAZOLE (METROGEL) 0.75 % gel     Sig: Apply  topically to the appropriate area as directed 2 (Two) Times a Day.     Dispense:  45 g     Refill:  5    simvastatin (ZOCOR) 20 MG tablet     Sig: Take 1 tablet by mouth Every Night.     Dispense:  90 tablet     Refill:  1    triamcinolone (KENALOG) 0.5 % ointment     Sig: Apply 1 Application topically to the appropriate area as directed 2 (Two) Times a Day As Needed for Rash.     Dispense:  60 g     Refill:  5     I reviewed recent labs with patient today.  Also reviewed previous hematology notes.  Cardiology notes were not available for review today.  I have  recommended stopping aspirin at this time due to the thrombocytopenia and easy bleeding.  We will recheck CBC with iron, folate, and B12 today at the diagnostic center.  We will plan to check labs including lipid panel as above just prior to next appointment.  We will continue Zocor and MetroGel.  I discussed Kenalog ointment to use as needed for the dermatitis of the right ear and right lower leg.  I cautioned him to avoid prolonged use of this ointment.  Colonoscopy is due later this year.  We will refer.    Return in about 6 months (around 9/24/2025), or if symptoms worsen or fail to improve, for Recheck, Labs Prior, Medicare Wellness.          Referring Provider (if known): No ref. provider found      This document has been electronically signed by Marilin Ward DO  March 24, 2025 10:15 EDT    Marilin Ward DO, FACOI  [address] [place] [address]  [phone] (office)    Part of this note may be an electronic transcription/translation of spoken language to printed text using the Dragon Dictation System.

## 2025-03-25 DIAGNOSIS — D69.6 THROMBOCYTOPENIA: Chronic | ICD-10-CM

## 2025-03-25 DIAGNOSIS — E78.2 MIXED HYPERLIPIDEMIA: Primary | Chronic | ICD-10-CM

## 2025-03-25 PROBLEM — Z12.11 ENCOUNTER FOR SCREENING FOR MALIGNANT NEOPLASM OF COLON: Status: ACTIVE | Noted: 2025-03-24

## 2025-03-25 LAB
FOLATE SERPL-MCNC: 18.9 NG/ML (ref 4.78–24.2)
VIT B12 BLD-MCNC: 669 PG/ML (ref 211–946)

## 2025-04-08 ENCOUNTER — LAB (OUTPATIENT)
Dept: ONCOLOGY | Facility: CLINIC | Age: 75
End: 2025-04-08
Payer: MEDICARE

## 2025-04-08 ENCOUNTER — CONSULT (OUTPATIENT)
Dept: ONCOLOGY | Facility: CLINIC | Age: 75
End: 2025-04-08
Payer: MEDICARE

## 2025-04-08 VITALS
WEIGHT: 176.2 LBS | RESPIRATION RATE: 18 BRPM | BODY MASS INDEX: 25.22 KG/M2 | HEART RATE: 62 BPM | DIASTOLIC BLOOD PRESSURE: 84 MMHG | HEIGHT: 70 IN | TEMPERATURE: 97.7 F | SYSTOLIC BLOOD PRESSURE: 179 MMHG | OXYGEN SATURATION: 100 %

## 2025-04-08 DIAGNOSIS — D69.6 THROMBOCYTOPENIA: Primary | Chronic | ICD-10-CM

## 2025-04-08 DIAGNOSIS — D69.6 THROMBOCYTOPENIA: Primary | ICD-10-CM

## 2025-04-08 LAB
BASOPHILS # BLD AUTO: 0.04 10*3/MM3 (ref 0–0.2)
BASOPHILS NFR BLD AUTO: 0.5 % (ref 0–1.5)
DEPRECATED RDW RBC AUTO: 47.4 FL (ref 37–54)
EOSINOPHIL # BLD AUTO: 0.27 10*3/MM3 (ref 0–0.4)
EOSINOPHIL NFR BLD AUTO: 3.1 % (ref 0.3–6.2)
ERYTHROCYTE [DISTWIDTH] IN BLOOD BY AUTOMATED COUNT: 12.9 % (ref 12.3–15.4)
FOLATE SERPL-MCNC: >20 NG/ML (ref 4.78–24.2)
HCT VFR BLD AUTO: 40.9 % (ref 37.5–51)
HGB BLD-MCNC: 13.2 G/DL (ref 13–17.7)
IMM GRANULOCYTES # BLD AUTO: 0.02 10*3/MM3 (ref 0–0.05)
IMM GRANULOCYTES NFR BLD AUTO: 0.2 % (ref 0–0.5)
LYMPHOCYTES # BLD AUTO: 1.59 10*3/MM3 (ref 0.7–3.1)
LYMPHOCYTES NFR BLD AUTO: 18.1 % (ref 19.6–45.3)
MCH RBC QN AUTO: 32 PG (ref 26.6–33)
MCHC RBC AUTO-ENTMCNC: 32.3 G/DL (ref 31.5–35.7)
MCV RBC AUTO: 99.3 FL (ref 79–97)
MONOCYTES # BLD AUTO: 0.57 10*3/MM3 (ref 0.1–0.9)
MONOCYTES NFR BLD AUTO: 6.5 % (ref 5–12)
NEUTROPHILS NFR BLD AUTO: 6.29 10*3/MM3 (ref 1.7–7)
NEUTROPHILS NFR BLD AUTO: 71.6 % (ref 42.7–76)
NRBC BLD AUTO-RTO: 0 /100 WBC (ref 0–0.2)
PLATELET # BLD AUTO: 120 10*3/MM3 (ref 140–450)
PLATELET # BLD AUTO: 228 10*3/MM3 (ref 140–450)
PLATELETS (CITRATED) BY AUTOMATED COUNT: 208 10*3/MM3 (ref 140–450)
PMV BLD AUTO: 9.6 FL (ref 6–12)
RBC # BLD AUTO: 4.12 10*6/MM3 (ref 4.14–5.8)
VIT B12 BLD-MCNC: 715 PG/ML (ref 211–946)
WBC NRBC COR # BLD AUTO: 8.78 10*3/MM3 (ref 3.4–10.8)

## 2025-04-08 PROCEDURE — 99204 OFFICE O/P NEW MOD 45 MIN: CPT | Performed by: INTERNAL MEDICINE

## 2025-04-08 PROCEDURE — 82746 ASSAY OF FOLIC ACID SERUM: CPT | Performed by: INTERNAL MEDICINE

## 2025-04-08 PROCEDURE — 85049 AUTOMATED PLATELET COUNT: CPT | Performed by: INTERNAL MEDICINE

## 2025-04-08 PROCEDURE — 82607 VITAMIN B-12: CPT | Performed by: INTERNAL MEDICINE

## 2025-04-08 PROCEDURE — 1160F RVW MEDS BY RX/DR IN RCRD: CPT | Performed by: INTERNAL MEDICINE

## 2025-04-08 PROCEDURE — G2211 COMPLEX E/M VISIT ADD ON: HCPCS | Performed by: INTERNAL MEDICINE

## 2025-04-08 PROCEDURE — 1126F AMNT PAIN NOTED NONE PRSNT: CPT | Performed by: INTERNAL MEDICINE

## 2025-04-08 PROCEDURE — 85025 COMPLETE CBC W/AUTO DIFF WBC: CPT | Performed by: INTERNAL MEDICINE

## 2025-04-08 PROCEDURE — 36415 COLL VENOUS BLD VENIPUNCTURE: CPT | Performed by: INTERNAL MEDICINE

## 2025-04-08 PROCEDURE — 1159F MED LIST DOCD IN RCRD: CPT | Performed by: INTERNAL MEDICINE

## 2025-04-08 NOTE — LETTER
April 8, 2025     Marilin Ward DO  [address]  [facility] [address]    Patient: Jem Crowder   YOB: 1950   Date of Visit: 4/8/2025     Dear Marilin Ward DO:       Thank you for referring Jem Crowder to me for evaluation. Below are the relevant portions of my assessment and plan of care.    If you have questions, please do not hesitate to call me. I look forward to following Jem along with you.         Sincerely,        Pat Hooks MD        CC: No Recipients    Pat Hooks MD  04/08/25 1438  Sign when Signing Visit  Jem Crowder  [id number]  1950 4/8/2025      Referring Provider:   Marilin Ward DO    Reason for Consultation:   Thrombocytopenia     Chief Complaint:  Thrombocytopenia      History of Present Illness   Jem Crowder is a very pleasant 74 y.o.  male who presents in consultation at the request of Dr. Marilin Ward for further management of thrombocytopenia.     Mr. Crowder was last evaluated by me in November 2017 for thrombocytopenia and leukopenia. At that time he reported a history significant for long standing thrombocytopenia that was initially evaluated by hematologist - Dr. Mayfield in 2014. At that time he was on daily doxycycline for acne rosacea and it was felt that his thrombocytopenia could be monitored. At that time he had also been on Lipitor for hyperlipidemia that was later changed to simvastatin due to insurance reasons and he believed that his thrombocytopenia began when he started simvastatin. At the time he was taking a daily multivitamin, otherwise denied of any NSAID use with exception of Aspirin 81 mg daily. He also denied of any other vitamin or herbal medication use. 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 He has had multiple CBCs since 2017 and in some cases his complete blood count results have noted platelet  clumping and was unable to obtain platelet count and that the platelet count is likely falsely decreased due to platelet clumping.  He denies of any fevers, weight loss, night sweats or lymphadenopathy and is feeling well otherwise. His ASA was recently discontinued by Dr. Ward and he reports that since stopping ASA he no longer bruises or bleeds easily.           The following portions of the patient's history were reviewed and updated as appropriate: allergies, current medications, past family history, past medical history, past social history, past surgical history and problem list.      Allergies   Allergen Reactions   • Penicillins Rash         Past Medical History:   Diagnosis Date   • Coronary artery disease    • Diverticulitis    • Rosacea          Past Surgical History:   Procedure Laterality Date   • CATARACT EXTRACTION Left 2019   • CATARACT EXTRACTION Right 2024   • COLONOSCOPY  10/01/2015   • EYE SURGERY Right 06/24/2024   • REFRACTIVE SURGERY Right 03/10/2025         Social History     Socioeconomic History   • Marital status:    Tobacco Use   • Smoking status: Never   • Smokeless tobacco: Former     Types: Chew     Quit date: 7/21/2000   Vaping Use   • Vaping status: Never Used   Substance and Sexual Activity   • Alcohol use: No   • Drug use: No   • Sexual activity: Defer           Family History   Problem Relation Age of Onset   • Heart disease Mother    • Hypertension Mother    • Lung disease Father         Black Lung   • Stomach cancer Father    • Cancer Brother 2009           Current Outpatient Medications:   •  bisacodyl (Dulcolax) 5 MG EC tablet, Take 1 tablet by mouth Daily As Needed for Constipation. TAKE 4 TABLETS AT 12PM, Disp: 4 tablet, Rfl: 0  •  diphenhydrAMINE (BENADRYL) 25 MG tablet, Take 2 tablets by mouth At Night As Needed for Itching or Sleep., Disp: , Rfl:   •  docusate sodium (COLACE) 250 MG capsule, Take 240 mg by mouth Daily., Disp: , Rfl:   •  Magnesium 100 MG  capsule, Take 100 mg by mouth., Disp: , Rfl:   •  metroNIDAZOLE (METROGEL) 0.75 % gel, Apply  topically to the appropriate area as directed 2 (Two) Times a Day., Disp: 45 g, Rfl: 5  •  Multiple Vitamin (MULTI VITAMIN MENS) tablet, Take 1 tablet by mouth Daily., Disp: , Rfl:   •  polyethylene glycol (MiraLax) 17 GM/SCOOP powder, Take 17 g by mouth Daily. TAKE 15 CAPFULS MIXED IN 32 OZ OF LIQUID AT 4PM AND 6PM, Disp: 510 g, Rfl: 0  •  simvastatin (ZOCOR) 20 MG tablet, Take 1 tablet by mouth Every Night., Disp: 90 tablet, Rfl: 1  •  triamcinolone (KENALOG) 0.5 % ointment, Apply 1 Application topically to the appropriate area as directed 2 (Two) Times a Day As Needed for Rash., Disp: 60 g, Rfl: 5        Review of Systems  Constitutional: No fever, chills, night sweats, positive weight gain.   HEENT:  No headaches, vision changes or hearing changes, no sinus drainage, sore throat.   Cardiovascular:  No palpitations, chest pain, syncopal episodes or edema.   Pulmonary:  No shortness of breath, hemoptysis, or cough.   Gastrointestinal:  No nausea or vomiting.  No constipation or diarrhea. No abdominal pain. No melena or hematochezia.   Genitourinary:  No hematuria, or changes in urination.   Musculoskeletal:  No pain, or joint problems.   Skin: No rashes or pruritus.   Endocrine:  No hot flashes or chills   Hematologic:  No history of free bleeding, spontaneous bleeding or clotting problems. No lymphadenopathy.    Immunologic:  No allergies or frequent infections.   Neurologic: No numbness, tingling, or weakness.   Psychiatric:  No anxiety or depression.         Physical Exam  Vital Signs: These were reviewed and listed as per patient’s electronic medical chart  Vitals:    04/08/25 1032   BP: 179/84   Pulse: 62   Resp: 18   Temp: 97.7 °F (36.5 °C)   SpO2: 100%   He reports that his blood pressure runs 120s to 130s systolics at home  General: Awake, alert and oriented, in no distress  HEENT: Head is atraumatic,  normocephalic, extraocular movements full, no scleral icterus  Neck: supple, no jvd, lymphadenopathy or masses  Cardiovascular: regular rate and rhythm without murmurs, rubs or gallops  Pulmonary: non-labored, clear to auscultation bilaterally, no wheezing  Abdomen: soft, non-tender, non-distended, normal active bowel sounds present, no organomegaly  Extremities: No clubbing, cyanosis or edema  Lymph: No cervical, supraclavicular adenopathy  Neurologic: Mental status as above, alert, awake and oriented, grossly non-focal exam  Skin: warm, dry, intact        Pain Score:  Pain Score    04/08/25 1032   PainSc: 0-No pain           PHQ-Score Total:  PHQ-9 Total Score:          LABS/STUDIES:   Lab on 03/24/2025   Component Date Value   • Vitamin B-12 03/24/2025 669    • Folate 03/24/2025 18.90    • Iron 03/24/2025 94    • Iron Saturation (TSAT) 03/24/2025 23    • Transferrin 03/24/2025 269    • TIBC 03/24/2025 401    • WBC 03/24/2025 5.73    • RBC 03/24/2025 4.13 (L)    • Hemoglobin 03/24/2025 13.4    • Hematocrit 03/24/2025 41.0    • MCV 03/24/2025 99.3 (H)    • MCH 03/24/2025 32.4    • MCHC 03/24/2025 32.7    • RDW 03/24/2025 12.5    • RDW-SD 03/24/2025 46.1    • MPV 03/24/2025 10.8    • Platelets 03/24/2025     • Glucose 03/24/2025 82    • BUN 03/24/2025 16    • Creatinine 03/24/2025 1.23    • Sodium 03/24/2025 142    • Potassium 03/24/2025 4.2    • Chloride 03/24/2025 104    • CO2 03/24/2025 26.0    • Calcium 03/24/2025 9.7    • Total Protein 03/24/2025 7.3    • Albumin 03/24/2025 4.5    • ALT (SGPT) 03/24/2025 23    • AST (SGOT) 03/24/2025 29    • Alkaline Phosphatase 03/24/2025 55    • Total Bilirubin 03/24/2025 0.4    • Globulin 03/24/2025 2.8    • A/G Ratio 03/24/2025 1.6    • BUN/Creatinine Ratio 03/24/2025 13.0    • Anion Gap 03/24/2025 12.0    • eGFR 03/24/2025 62.0    • Total Cholesterol 03/24/2025 147    • Triglycerides 03/24/2025 61    • HDL Cholesterol 03/24/2025 69 (H)    • LDL Cholesterol  03/24/2025 65     • VLDL Cholesterol 03/24/2025 13    • LDL/HDL Ratio 03/24/2025 0.95    • TSH 03/24/2025 1.800    • Creatine Kinase 03/24/2025 101    Clinical Support on 03/20/2025   Component Date Value   • WBC 03/20/2025 5.68    • RBC 03/20/2025 4.01 (L)    • Hemoglobin 03/20/2025 13.1    • Hematocrit 03/20/2025 39.0    • MCV 03/20/2025 97.3 (H)    • MCH 03/20/2025 32.7    • MCHC 03/20/2025 33.6    • RDW 03/20/2025 12.3    • RDW-SD 03/20/2025 44.0    • MPV 03/20/2025 12.2 (H)    • Platelets 03/20/2025 69 (L)    • Glucose 03/20/2025 81    • BUN 03/20/2025 23    • Creatinine 03/20/2025 1.22    • Sodium 03/20/2025 146 (H)    • Potassium 03/20/2025 4.2    • Chloride 03/20/2025 107    • CO2 03/20/2025 27.0    • Calcium 03/20/2025 9.3    • Total Protein 03/20/2025 7.2    • Albumin 03/20/2025 4.5    • ALT (SGPT) 03/20/2025 22    • AST (SGOT) 03/20/2025 31    • Alkaline Phosphatase 03/20/2025 56    • Total Bilirubin 03/20/2025 0.5    • Globulin 03/20/2025 2.7    • A/G Ratio 03/20/2025 1.7    • BUN/Creatinine Ratio 03/20/2025 18.9    • Anion Gap 03/20/2025 12.0    • eGFR 03/20/2025 62.6    • Total Cholesterol 03/20/2025 149    • Triglycerides 03/20/2025 45    • HDL Cholesterol 03/20/2025 70 (H)    • LDL Cholesterol  03/20/2025 69    • VLDL Cholesterol 03/20/2025 10    • LDL/HDL Ratio 03/20/2025 1.00    • TSH 03/20/2025 1.550    • PSA 03/20/2025 2.420          PATHOLOGY:   3/13/17           Assessment & Plan   Jem Crowder is a very pleasant 74 y.o.  male who presents in consultation at the request of Dr. Marilin Ward for further management of thrombocytopenia.     Thrombocytopenia  - Mr. Crowder was last evaluated in November 2017 for thrombocytopenia which was felt to be secondary to pseudothrombocytopenia.  - Repeat CBC reveals a normal platelet count at 228 thousand. It appears that patient's platelets clump in EDTA tube, therefore would recommend obtaining platelet counts in non-EDTA tubes - lavender and blue top to obtain  a more accurate platelet count. Peripheral smear is pending. Platelet counts ranged from 120 to 228 thousand when placed in EDTA and non EDTA tubes.   - B12 and folate studies to further evaluate are pending.    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     ACO / TONYA/Other  Quality measures  -  Jem Crowder  reports that he has never smoked. He quit smokeless tobacco use about 24 years ago.  His smokeless tobacco use included chew.   -  Jem Crowder received 2024 flu vaccine.  -  Jem Crowder reports a pain score of 0.  Given his pain assessment as noted, treatment options were discussed and the following options were decided upon as a follow-up plan to address the patient's pain: continuation of current treatment plan for pain.  -  Current outpatient and discharge medications have been reconciled for the patient.  Reviewed by: Pat Hooks MD      I will have the patient return in follow up appointment to review test results in two to three weeks with labs (CBC, platelet count in non EDTA tubes). He understands that should he have any questions or concerns prior to his appointment he should give us a call at any time and I would be happy to see him sooner. It was a pleasure to see this patient in clinic today, thank you for allowing me to participate in the care of this patient.        Pat Hooks MD   04/08/25   10:54 EDT

## 2025-04-08 NOTE — PROGRESS NOTES
Jem Crowder  [id number]  1950 4/8/2025      Referring Provider:   Marilin Ward DO    Reason for Consultation:   Thrombocytopenia     Chief Complaint:  Thrombocytopenia      History of Present Illness   Jem Crowder is a very pleasant 74 y.o.  male who presents in consultation at the request of Dr. Marilin Ward for further management of thrombocytopenia.     Mr. Crowder was last evaluated by me in November 2017 for thrombocytopenia and leukopenia. At that time he reported a history significant for long standing thrombocytopenia that was initially evaluated by hematologist - Dr. Mayfield in 2014. At that time he was on daily doxycycline for acne rosacea and it was felt that his thrombocytopenia could be monitored. At that time he had also been on Lipitor for hyperlipidemia that was later changed to simvastatin due to insurance reasons and he believed that his thrombocytopenia began when he started simvastatin. At the time he was taking a daily multivitamin, otherwise denied of any NSAID use with exception of Aspirin 81 mg daily. He also denied of any other vitamin or herbal medication use. 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 He has had multiple CBCs since 2017 and in some cases his complete blood count results have noted platelet clumping and was unable to obtain platelet count and that the platelet count is likely falsely decreased due to platelet clumping.  He denies of any fevers, weight loss, night sweats or lymphadenopathy and is feeling well otherwise. His ASA was recently discontinued by Dr. Ward and he reports that since stopping ASA he no longer bruises or bleeds easily.           The following portions of the patient's history were reviewed and updated as appropriate: allergies, current medications, past family history, past medical history, past social history, past  surgical history and problem list.      Allergies   Allergen Reactions    Penicillins Rash         Past Medical History:   Diagnosis Date    Coronary artery disease     Diverticulitis     Rosacea          Past Surgical History:   Procedure Laterality Date    CATARACT EXTRACTION Left 2019    CATARACT EXTRACTION Right 2024    COLONOSCOPY  10/01/2015    EYE SURGERY Right 06/24/2024    REFRACTIVE SURGERY Right 03/10/2025         Social History     Socioeconomic History    Marital status:    Tobacco Use    Smoking status: Never    Smokeless tobacco: Former     Types: Chew     Quit date: 7/21/2000   Vaping Use    Vaping status: Never Used   Substance and Sexual Activity    Alcohol use: No    Drug use: No    Sexual activity: Defer           Family History   Problem Relation Age of Onset    Heart disease Mother     Hypertension Mother     Lung disease Father         Black Lung    Stomach cancer Father     Cancer Brother 2009           Current Outpatient Medications:     bisacodyl (Dulcolax) 5 MG EC tablet, Take 1 tablet by mouth Daily As Needed for Constipation. TAKE 4 TABLETS AT 12PM, Disp: 4 tablet, Rfl: 0    diphenhydrAMINE (BENADRYL) 25 MG tablet, Take 2 tablets by mouth At Night As Needed for Itching or Sleep., Disp: , Rfl:     docusate sodium (COLACE) 250 MG capsule, Take 240 mg by mouth Daily., Disp: , Rfl:     Magnesium 100 MG capsule, Take 100 mg by mouth., Disp: , Rfl:     metroNIDAZOLE (METROGEL) 0.75 % gel, Apply  topically to the appropriate area as directed 2 (Two) Times a Day., Disp: 45 g, Rfl: 5    Multiple Vitamin (MULTI VITAMIN MENS) tablet, Take 1 tablet by mouth Daily., Disp: , Rfl:     polyethylene glycol (MiraLax) 17 GM/SCOOP powder, Take 17 g by mouth Daily. TAKE 15 CAPFULS MIXED IN 32 OZ OF LIQUID AT 4PM AND 6PM, Disp: 510 g, Rfl: 0    simvastatin (ZOCOR) 20 MG tablet, Take 1 tablet by mouth Every Night., Disp: 90 tablet, Rfl: 1    triamcinolone (KENALOG) 0.5 % ointment, Apply 1 Application  topically to the appropriate area as directed 2 (Two) Times a Day As Needed for Rash., Disp: 60 g, Rfl: 5        Review of Systems  Constitutional: No fever, chills, night sweats, positive weight gain.   HEENT:  No headaches, vision changes or hearing changes, no sinus drainage, sore throat.   Cardiovascular:  No palpitations, chest pain, syncopal episodes or edema.   Pulmonary:  No shortness of breath, hemoptysis, or cough.   Gastrointestinal:  No nausea or vomiting.  No constipation or diarrhea. No abdominal pain. No melena or hematochezia.   Genitourinary:  No hematuria, or changes in urination.   Musculoskeletal:  No pain, or joint problems.   Skin: No rashes or pruritus.   Endocrine:  No hot flashes or chills   Hematologic:  No history of free bleeding, spontaneous bleeding or clotting problems. No lymphadenopathy.    Immunologic:  No allergies or frequent infections.   Neurologic: No numbness, tingling, or weakness.   Psychiatric:  No anxiety or depression.         Physical Exam  Vital Signs: These were reviewed and listed as per patient’s electronic medical chart  Vitals:    04/08/25 1032   BP: 179/84   Pulse: 62   Resp: 18   Temp: 97.7 °F (36.5 °C)   SpO2: 100%   He reports that his blood pressure runs 120s to 130s systolics at home  General: Awake, alert and oriented, in no distress  HEENT: Head is atraumatic, normocephalic, extraocular movements full, no scleral icterus  Neck: supple, no jvd, lymphadenopathy or masses  Cardiovascular: regular rate and rhythm without murmurs, rubs or gallops  Pulmonary: non-labored, clear to auscultation bilaterally, no wheezing  Abdomen: soft, non-tender, non-distended, normal active bowel sounds present, no organomegaly  Extremities: No clubbing, cyanosis or edema  Lymph: No cervical, supraclavicular adenopathy  Neurologic: Mental status as above, alert, awake and oriented, grossly non-focal exam  Skin: warm, dry, intact        Pain Score:  Pain Score    04/08/25 1032    PainSc: 0-No pain           PHQ-Score Total:  PHQ-9 Total Score:          LABS/STUDIES:   Lab on 03/24/2025   Component Date Value    Vitamin B-12 03/24/2025 669     Folate 03/24/2025 18.90     Iron 03/24/2025 94     Iron Saturation (TSAT) 03/24/2025 23     Transferrin 03/24/2025 269     TIBC 03/24/2025 401     WBC 03/24/2025 5.73     RBC 03/24/2025 4.13 (L)     Hemoglobin 03/24/2025 13.4     Hematocrit 03/24/2025 41.0     MCV 03/24/2025 99.3 (H)     MCH 03/24/2025 32.4     MCHC 03/24/2025 32.7     RDW 03/24/2025 12.5     RDW-SD 03/24/2025 46.1     MPV 03/24/2025 10.8     Platelets 03/24/2025      Glucose 03/24/2025 82     BUN 03/24/2025 16     Creatinine 03/24/2025 1.23     Sodium 03/24/2025 142     Potassium 03/24/2025 4.2     Chloride 03/24/2025 104     CO2 03/24/2025 26.0     Calcium 03/24/2025 9.7     Total Protein 03/24/2025 7.3     Albumin 03/24/2025 4.5     ALT (SGPT) 03/24/2025 23     AST (SGOT) 03/24/2025 29     Alkaline Phosphatase 03/24/2025 55     Total Bilirubin 03/24/2025 0.4     Globulin 03/24/2025 2.8     A/G Ratio 03/24/2025 1.6     BUN/Creatinine Ratio 03/24/2025 13.0     Anion Gap 03/24/2025 12.0     eGFR 03/24/2025 62.0     Total Cholesterol 03/24/2025 147     Triglycerides 03/24/2025 61     HDL Cholesterol 03/24/2025 69 (H)     LDL Cholesterol  03/24/2025 65     VLDL Cholesterol 03/24/2025 13     LDL/HDL Ratio 03/24/2025 0.95     TSH 03/24/2025 1.800     Creatine Kinase 03/24/2025 101    Clinical Support on 03/20/2025   Component Date Value    WBC 03/20/2025 5.68     RBC 03/20/2025 4.01 (L)     Hemoglobin 03/20/2025 13.1     Hematocrit 03/20/2025 39.0     MCV 03/20/2025 97.3 (H)     MCH 03/20/2025 32.7     MCHC 03/20/2025 33.6     RDW 03/20/2025 12.3     RDW-SD 03/20/2025 44.0     MPV 03/20/2025 12.2 (H)     Platelets 03/20/2025 69 (L)     Glucose 03/20/2025 81     BUN 03/20/2025 23     Creatinine 03/20/2025 1.22     Sodium 03/20/2025 146 (H)     Potassium 03/20/2025 4.2     Chloride  03/20/2025 107     CO2 03/20/2025 27.0     Calcium 03/20/2025 9.3     Total Protein 03/20/2025 7.2     Albumin 03/20/2025 4.5     ALT (SGPT) 03/20/2025 22     AST (SGOT) 03/20/2025 31     Alkaline Phosphatase 03/20/2025 56     Total Bilirubin 03/20/2025 0.5     Globulin 03/20/2025 2.7     A/G Ratio 03/20/2025 1.7     BUN/Creatinine Ratio 03/20/2025 18.9     Anion Gap 03/20/2025 12.0     eGFR 03/20/2025 62.6     Total Cholesterol 03/20/2025 149     Triglycerides 03/20/2025 45     HDL Cholesterol 03/20/2025 70 (H)     LDL Cholesterol  03/20/2025 69     VLDL Cholesterol 03/20/2025 10     LDL/HDL Ratio 03/20/2025 1.00     TSH 03/20/2025 1.550     PSA 03/20/2025 2.420          PATHOLOGY:   3/13/17           Assessment & Plan   Jem Crowder is a very pleasant 74 y.o.  male who presents in consultation at the request of Dr. Marilin Ward for further management of thrombocytopenia.     Thrombocytopenia  - Mr. Crowder was last evaluated in November 2017 for thrombocytopenia which was felt to be secondary to pseudothrombocytopenia.  - Repeat CBC reveals a normal platelet count at 228 thousand. It appears that patient's platelets clump in EDTA tube, therefore would recommend obtaining platelet counts in non-EDTA tubes - lavender and blue top to obtain a more accurate platelet count. Peripheral smear is pending. Platelet counts ranged from 120 to 228 thousand when placed in EDTA and non EDTA tubes.   - B12 and folate studies to further evaluate are pending.    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     ACO / TONYA/Other  Quality measures  -  Jem Crowder  reports that he has never smoked. He quit smokeless tobacco use about 24 years ago.  His smokeless tobacco use included chew.   -  Jem Crowder received 2024 flu vaccine.  -  Jem Crowder reports a pain score of 0.  Given his pain assessment as noted, treatment options were discussed  and the following options were decided upon as a follow-up plan to address the patient's pain: continuation of current treatment plan for pain.  -  Current outpatient and discharge medications have been reconciled for the patient.  Reviewed by: Pat Hooks MD      I will have the patient return in follow up appointment to review test results in two to three weeks with labs (CBC, platelet count in non EDTA tubes). He understands that should he have any questions or concerns prior to his appointment he should give us a call at any time and I would be happy to see him sooner. It was a pleasure to see this patient in clinic today, thank you for allowing me to participate in the care of this patient.        Pat Hooks MD   04/08/25   10:54 EDT

## 2025-04-08 NOTE — PROGRESS NOTES
Venipuncture Blood Specimen Collection  Venipuncture performed in left arm by Geetha Ambrosio MA with good hemostasis. Patient tolerated the procedure well without complications.   04/08/25   Geetha Ambrosio MA

## 2025-04-09 LAB — REF LAB TEST METHOD: NORMAL

## 2025-04-23 NOTE — PROGRESS NOTES
Jem Crowder  [id number]  1950 4/28/2025      Referring Provider:   Marilin Ward DO    Reason for Follow Up:   Thrombocytopenia     Chief Complaint:  Thrombocytopenia      History of Present Illness   Jem Crowder is a very pleasant 75 y.o.  male who presents in follow up appointment at the request of Dr. Marilin Ward for further management of thrombocytopenia.      Mr. Crowder was last evaluated by me in November 2017 for thrombocytopenia and leukopenia. At that time he reported a history significant for long standing thrombocytopenia that was initially evaluated by hematologist - Dr. Mayfield in 2014. At that time he was on daily doxycycline for acne rosacea and it was felt that his thrombocytopenia could be monitored. At that time he had also been on Lipitor for hyperlipidemia that was later changed to simvastatin due to insurance reasons and he believed that his thrombocytopenia began when he started simvastatin. At the time he was taking a daily multivitamin, otherwise denied of any NSAID use with exception of Aspirin 81 mg daily. He also denied of any other vitamin or herbal medication use. 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 He has had multiple CBCs since 2017 and in some cases his complete blood count results have noted platelet clumping and was unable to obtain platelet count and that the platelet count is likely falsely decreased due to platelet clumping.  He denies of any fevers, weight loss, night sweats or lymphadenopathy and is feeling well otherwise. His ASA was recently discontinued by Dr. Ward and he reports that since stopping ASA he no longer bruises or bleeds easily.        Interim History:  Mr. Crowder presents today for repeat labs and follow up. He denies of any changes since his last visit.         The following portions of the patient's history were reviewed  and updated as appropriate: allergies, current medications, past family history, past medical history, past social history, past surgical history and problem list.    Allergies   Allergen Reactions    Penicillins Rash       Past Medical History:   Diagnosis Date    Coronary artery disease     Diverticulitis     Rosacea        Past Surgical History:   Procedure Laterality Date    CATARACT EXTRACTION Left 2019    CATARACT EXTRACTION Right 2024    COLONOSCOPY  10/01/2015    EYE SURGERY Right 06/24/2024    REFRACTIVE SURGERY Right 03/10/2025       Social History     Socioeconomic History    Marital status:    Tobacco Use    Smoking status: Never    Smokeless tobacco: Former     Types: Chew     Quit date: 7/21/2000   Vaping Use    Vaping status: Never Used   Substance and Sexual Activity    Alcohol use: No    Drug use: No    Sexual activity: Defer       Family History   Problem Relation Age of Onset    Heart disease Mother     Hypertension Mother     Lung disease Father         Black Lung    Stomach cancer Father     Cancer Brother          Current Outpatient Medications:     bisacodyl (Dulcolax) 5 MG EC tablet, Take 1 tablet by mouth Daily As Needed for Constipation. TAKE 4 TABLETS AT 12PM, Disp: 4 tablet, Rfl: 0    diphenhydrAMINE (BENADRYL) 25 MG tablet, Take 2 tablets by mouth At Night As Needed for Itching or Sleep., Disp: , Rfl:     docusate sodium (COLACE) 250 MG capsule, Take 240 mg by mouth Daily., Disp: , Rfl:     Magnesium 100 MG capsule, Take 100 mg by mouth., Disp: , Rfl:     metroNIDAZOLE (METROGEL) 0.75 % gel, Apply  topically to the appropriate area as directed 2 (Two) Times a Day., Disp: 45 g, Rfl: 5    Multiple Vitamin (MULTI VITAMIN MENS) tablet, Take 1 tablet by mouth Daily., Disp: , Rfl:     polyethylene glycol (MiraLax) 17 GM/SCOOP powder, Take 17 g by mouth Daily. TAKE 15 CAPFULS MIXED IN 32 OZ OF LIQUID AT 4PM AND 6PM, Disp: 510 g, Rfl: 0    simvastatin (ZOCOR) 20 MG tablet, Take 1 tablet  by mouth Every Night., Disp: 90 tablet, Rfl: 1    triamcinolone (KENALOG) 0.5 % ointment, Apply 1 Application topically to the appropriate area as directed 2 (Two) Times a Day As Needed for Rash., Disp: 60 g, Rfl: 5        Review of Systems  Pertinent positives are listed as per history of present of illness, all other systems reviewed and are negative.        Physical Exam  Vital Signs: These were reviewed and listed as per patient’s electronic medical chart  Vitals:    04/28/25 0956   BP: 154/76   Pulse: 65   Resp: 18   Temp: 97.1 °F (36.2 °C)   SpO2: 100%     General: Patient is awake, alert, and in no acute distress.  Head: Normocephalic, atraumatic  Eyes: EOMI. Conjunctivae and sclerae normal.  Ears: Ears appear intact with no abnormalities noted.   Neck: Trachea midline. No obvious JVD.  Lungs: Respirations appear to be regular, even and unlabored with no signs of respiratory distress. No audible wheezing.  Abdomen: No obvious abdominal distension.  MS: Muscle tone appears normal. No gross deformities.  Extremities: No clubbing, cyanosis or edema noted.  Skin: No visible bleeding, bruising, or rash.  Neurologic: Alert and oriented x3. No gross focal deficits.        Pain Score:  Pain Score    04/28/25 0956   PainSc: 0-No pain           PHQ-Score Total:  PHQ-9 Total Score:          LABS/STUDIES:   Office Visit on 04/28/2025   Component Date Value    Platelets (Citrated Bloo* 04/28/2025 194     Platelets 04/28/2025 50 (L)     WBC 04/28/2025 6.75     RBC 04/28/2025 4.09 (L)     Hemoglobin 04/28/2025 12.7 (L)     Hematocrit 04/28/2025 40.6     MCV 04/28/2025 99.3 (H)     MCH 04/28/2025 31.1     MCHC 04/28/2025 31.3 (L)     RDW 04/28/2025 12.9     RDW-SD 04/28/2025 47.1     MPV 04/28/2025 9.6     Platelets 04/28/2025 216     Neutrophil % 04/28/2025 67.0     Lymphocyte % 04/28/2025 21.6     Monocyte % 04/28/2025 7.1     Eosinophil % 04/28/2025 3.6     Basophil % 04/28/2025 0.6     Immature Grans % 04/28/2025 0.1      Neutrophils, Absolute 2025 4.52     Lymphocytes, Absolute 2025 1.46     Monocytes, Absolute 2025 0.48     Eosinophils, Absolute 2025 0.24     Basophils, Absolute 2025 0.04     Immature Grans, Absolute 2025 0.01     nRBC 2025 0.0    Consult on 2025   Component Date Value    Platelets (Citrated Bloo* 2025 208     Platelets 2025 120 (L)     Reference Lab Report 2025                      Value:[facility]  [address] [place] [address]  Phone: [phone] or [phone]  Fax: [phone]  Tommy Qiu M.D., Medical Director    PATIENT NAME                                 LABORATORY NO.  [id number]   RAZIA NOGUEIRA                                [id number]  [id number]  [facility]                     AGE                SEX     SSN            CLIENT REF #  [place]                                       74       1950          [id number]    [id number]  [facility]                        REQUESTING M.D.       ATTENDING M.D..        [place] [address]                               CHRISTIANO LIN    DATE COLLECTED        DATE RECEIVED          DATE REPORTED  2025    DIAGNOSIS:  PERIPHERAL SMEAR  Macrocytic, normochromic RBCs without anemia.  Normal total WBC count and differential. No granulocytic dysplasia or blasts  identified.  Adequate                           platelets.      CLINICAL HISTORY:  Thrombocytopenia    CLINICAL LABORATORY DATA  WBC        8.78 x10/3/-L    RDW         12.9%  HGB        13.2 g/dl        PLT         228 x10/3/-L  HCT        40.9%            LYMPHS      18.1%  MCV        99.3fL           NEUTS       71.6%  MONO        6.5%  EOS         3.1%  BASO        0.5%    PERIPHERAL SMEAR MICROSCOPIC DESCRIPTION:  Callejas stained smears are reviewed microscopically. See diagnosis for details.    Professional  interpretation rendered by Geovanni Bernard M.D., [facility] at [facility] [employer], [address] [place] [address], [place] [address].    GROSS DESCRIPTION:  RECEIVED 1 STAINED AND 1 UNSTAINED SLIDES - MG 4/8/2025    REVIEWED, DIAGNOSED AND ELECTRONICALLY  SIGNED BY:    Geovanni Bernard M.D., HEATH  CPT CODES:        26161      Vitamin B-12 04/08/2025 715     Folate 04/08/2025 >20.00     WBC 04/08/2025 8.78     RBC 04/08/2025 4.12 (L)     Hemoglobin 04/08/2025 13.2     Hematocrit 04/08/2025 40.9     MCV 04/08/2025 99.3 (H)     MCH 04/08/2025 32.0     MCHC 04/08/2025 32.3     RDW 04/08/2025 12.9     RDW-SD 04/08/2025 47.4     MPV 04/08/2025 9.6     Platelets 04/08/2025 228     Neutrophil % 04/08/2025 71.6     Lymphocyte % 04/08/2025 18.1 (L)     Monocyte % 04/08/2025 6.5     Eosinophil % 04/08/2025 3.1     Basophil % 04/08/2025 0.5     Immature Grans % 04/08/2025 0.2     Neutrophils, Absolute 04/08/2025 6.29     Lymphocytes, Absolute 04/08/2025 1.59     Monocytes, Absolute 04/08/2025 0.57     Eosinophils, Absolute 04/08/2025 0.27     Basophils, Absolute 04/08/2025 0.04     Immature Grans, Absolute 04/08/2025 0.02     nRBC 04/08/2025 0.0    Lab on 03/24/2025   Component Date Value    Vitamin B-12 03/24/2025 669     Folate 03/24/2025 18.90     Iron 03/24/2025 94     Iron Saturation (TSAT) 03/24/2025 23     Transferrin 03/24/2025 269     TIBC 03/24/2025 401     WBC 03/24/2025 5.73     RBC 03/24/2025 4.13 (L)     Hemoglobin 03/24/2025 13.4     Hematocrit 03/24/2025 41.0     MCV 03/24/2025 99.3 (H)     MCH 03/24/2025 32.4     MCHC 03/24/2025 32.7     RDW 03/24/2025 12.5     RDW-SD 03/24/2025 46.1     MPV 03/24/2025 10.8     Platelets 03/24/2025      Glucose 03/24/2025 82     BUN 03/24/2025 16     Creatinine 03/24/2025 1.23     Sodium 03/24/2025 142     Potassium 03/24/2025 4.2     Chloride 03/24/2025 104     CO2 03/24/2025 26.0     Calcium 03/24/2025 9.7     Total Protein 03/24/2025 7.3     Albumin 03/24/2025 4.5     ALT (SGPT) 03/24/2025  23     AST (SGOT) 03/24/2025 29     Alkaline Phosphatase 03/24/2025 55     Total Bilirubin 03/24/2025 0.4     Globulin 03/24/2025 2.8     A/G Ratio 03/24/2025 1.6     BUN/Creatinine Ratio 03/24/2025 13.0     Anion Gap 03/24/2025 12.0     eGFR 03/24/2025 62.0     Total Cholesterol 03/24/2025 147     Triglycerides 03/24/2025 61     HDL Cholesterol 03/24/2025 69 (H)     LDL Cholesterol  03/24/2025 65     VLDL Cholesterol 03/24/2025 13     LDL/HDL Ratio 03/24/2025 0.95     TSH 03/24/2025 1.800     Creatine Kinase 03/24/2025 101    Clinical Support on 03/20/2025   Component Date Value    WBC 03/20/2025 5.68     RBC 03/20/2025 4.01 (L)     Hemoglobin 03/20/2025 13.1     Hematocrit 03/20/2025 39.0     MCV 03/20/2025 97.3 (H)     MCH 03/20/2025 32.7     MCHC 03/20/2025 33.6     RDW 03/20/2025 12.3     RDW-SD 03/20/2025 44.0     MPV 03/20/2025 12.2 (H)     Platelets 03/20/2025 69 (L)     Glucose 03/20/2025 81     BUN 03/20/2025 23     Creatinine 03/20/2025 1.22     Sodium 03/20/2025 146 (H)     Potassium 03/20/2025 4.2     Chloride 03/20/2025 107     CO2 03/20/2025 27.0     Calcium 03/20/2025 9.3     Total Protein 03/20/2025 7.2     Albumin 03/20/2025 4.5     ALT (SGPT) 03/20/2025 22     AST (SGOT) 03/20/2025 31     Alkaline Phosphatase 03/20/2025 56     Total Bilirubin 03/20/2025 0.5     Globulin 03/20/2025 2.7     A/G Ratio 03/20/2025 1.7     BUN/Creatinine Ratio 03/20/2025 18.9     Anion Gap 03/20/2025 12.0     eGFR 03/20/2025 62.6     Total Cholesterol 03/20/2025 149     Triglycerides 03/20/2025 45     HDL Cholesterol 03/20/2025 70 (H)     LDL Cholesterol  03/20/2025 69     VLDL Cholesterol 03/20/2025 10     LDL/HDL Ratio 03/20/2025 1.00     TSH 03/20/2025 1.550     PSA 03/20/2025 2.420          PATHOLOGY:   3/13/17             Assessment & Plan   Jem Crowder is a very pleasant 75 y.o.  male who presents in follow up appointment at the request of Dr. Marilin Ward for further management of thrombocytopenia.       Thrombocytopenia  - Mr. Crowder was last evaluated in November 2017 for thrombocytopenia which was felt to be secondary to pseudothrombocytopenia.  - Repeat CBC reveals a normal platelet count at 228 thousand. It appears that patient's platelets clump in EDTA tube, therefore would recommend obtaining platelet counts in non-EDTA tubes - lavender and blue top to obtain a more accurate platelet count. Peripheral smear shows adequate platelets. Platelet counts ranged from 120 to 228 thousand when placed in EDTA and non EDTA tubes.   - B12 and folate studies show repletion.    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 Repeat platelet count today in non EDTA tubes shows a normal platelet count at 194 and 216 thousand.       ACO / TONYA/Other  Quality measures  -  Jem Crowder  reports that he has never smoked. He quit smokeless tobacco use about 24 years ago.  His smokeless tobacco use included chew.   -  Jem Crowder received 2024 flu vaccine.  -  Jem Crowder reports a pain score of 0.  Given his pain assessment as noted, treatment options were discussed and the following options were decided upon as a follow-up plan to address the patient's pain: continuation of current treatment plan for pain.  -  Current outpatient and discharge medications have been reconciled for the patient.  Reviewed by: Pat Hooks MD        I will have the patient return in follow up appointment in six months with labs (CBC, platelet count in non EDTA tubes). If platelet count remains normal he can thereafter follow with his PCP. He understands that should he have any questions or concerns prior to his appointment he should give us a call at any time and I would be happy to see him sooner. It was a pleasure to see this patient in clinic today, thank you for allowing me to participate in the care of this patient.        Pat Hooks MD   04/28/25    10:26 EDT

## 2025-04-24 DIAGNOSIS — D69.6 THROMBOCYTOPENIA: Primary | ICD-10-CM

## 2025-04-28 ENCOUNTER — OFFICE VISIT (OUTPATIENT)
Dept: ONCOLOGY | Facility: CLINIC | Age: 75
End: 2025-04-28
Payer: MEDICARE

## 2025-04-28 ENCOUNTER — LAB (OUTPATIENT)
Dept: ONCOLOGY | Facility: CLINIC | Age: 75
End: 2025-04-28
Payer: MEDICARE

## 2025-04-28 VITALS
TEMPERATURE: 97.1 F | SYSTOLIC BLOOD PRESSURE: 154 MMHG | RESPIRATION RATE: 18 BRPM | OXYGEN SATURATION: 100 % | DIASTOLIC BLOOD PRESSURE: 76 MMHG | HEIGHT: 70 IN | HEART RATE: 65 BPM | BODY MASS INDEX: 24.79 KG/M2 | WEIGHT: 173.2 LBS

## 2025-04-28 DIAGNOSIS — D69.6 THROMBOCYTOPENIA: Primary | ICD-10-CM

## 2025-04-28 DIAGNOSIS — D69.6 THROMBOCYTOPENIA: Primary | Chronic | ICD-10-CM

## 2025-04-28 LAB
BASOPHILS # BLD AUTO: 0.04 10*3/MM3 (ref 0–0.2)
BASOPHILS NFR BLD AUTO: 0.6 % (ref 0–1.5)
DEPRECATED RDW RBC AUTO: 47.1 FL (ref 37–54)
EOSINOPHIL # BLD AUTO: 0.24 10*3/MM3 (ref 0–0.4)
EOSINOPHIL NFR BLD AUTO: 3.6 % (ref 0.3–6.2)
ERYTHROCYTE [DISTWIDTH] IN BLOOD BY AUTOMATED COUNT: 12.9 % (ref 12.3–15.4)
HCT VFR BLD AUTO: 40.6 % (ref 37.5–51)
HGB BLD-MCNC: 12.7 G/DL (ref 13–17.7)
IMM GRANULOCYTES # BLD AUTO: 0.01 10*3/MM3 (ref 0–0.05)
IMM GRANULOCYTES NFR BLD AUTO: 0.1 % (ref 0–0.5)
LYMPHOCYTES # BLD AUTO: 1.46 10*3/MM3 (ref 0.7–3.1)
LYMPHOCYTES NFR BLD AUTO: 21.6 % (ref 19.6–45.3)
MCH RBC QN AUTO: 31.1 PG (ref 26.6–33)
MCHC RBC AUTO-ENTMCNC: 31.3 G/DL (ref 31.5–35.7)
MCV RBC AUTO: 99.3 FL (ref 79–97)
MONOCYTES # BLD AUTO: 0.48 10*3/MM3 (ref 0.1–0.9)
MONOCYTES NFR BLD AUTO: 7.1 % (ref 5–12)
NEUTROPHILS NFR BLD AUTO: 4.52 10*3/MM3 (ref 1.7–7)
NEUTROPHILS NFR BLD AUTO: 67 % (ref 42.7–76)
NRBC BLD AUTO-RTO: 0 /100 WBC (ref 0–0.2)
PLATELET # BLD AUTO: 216 10*3/MM3 (ref 140–450)
PLATELET # BLD AUTO: 50 10*3/MM3 (ref 140–450)
PLATELETS (CITRATED) BY AUTOMATED COUNT: 194 10*3/MM3 (ref 140–450)
PMV BLD AUTO: 9.6 FL (ref 6–12)
RBC # BLD AUTO: 4.09 10*6/MM3 (ref 4.14–5.8)
WBC NRBC COR # BLD AUTO: 6.75 10*3/MM3 (ref 3.4–10.8)

## 2025-04-28 PROCEDURE — 36415 COLL VENOUS BLD VENIPUNCTURE: CPT | Performed by: INTERNAL MEDICINE

## 2025-04-28 PROCEDURE — 1159F MED LIST DOCD IN RCRD: CPT | Performed by: INTERNAL MEDICINE

## 2025-04-28 PROCEDURE — 85049 AUTOMATED PLATELET COUNT: CPT | Performed by: INTERNAL MEDICINE

## 2025-04-28 PROCEDURE — 1160F RVW MEDS BY RX/DR IN RCRD: CPT | Performed by: INTERNAL MEDICINE

## 2025-04-28 PROCEDURE — 1126F AMNT PAIN NOTED NONE PRSNT: CPT | Performed by: INTERNAL MEDICINE

## 2025-04-28 PROCEDURE — 99213 OFFICE O/P EST LOW 20 MIN: CPT | Performed by: INTERNAL MEDICINE

## 2025-04-28 PROCEDURE — G2211 COMPLEX E/M VISIT ADD ON: HCPCS | Performed by: INTERNAL MEDICINE

## 2025-04-28 PROCEDURE — 85025 COMPLETE CBC W/AUTO DIFF WBC: CPT | Performed by: INTERNAL MEDICINE

## 2025-04-28 NOTE — PROGRESS NOTES
Venipuncture Blood Specimen Collection  Venipuncture performed in left arm by Geetha Ambrosio MA with good hemostasis. Patient tolerated the procedure well without complications.   04/28/25   Geetha Ambrosio MA

## 2025-06-18 ENCOUNTER — ANESTHESIA EVENT (OUTPATIENT)
Dept: PERIOP | Facility: HOSPITAL | Age: 75
End: 2025-06-18
Payer: MEDICARE

## 2025-06-18 ENCOUNTER — ANESTHESIA (OUTPATIENT)
Dept: PERIOP | Facility: HOSPITAL | Age: 75
End: 2025-06-18
Payer: MEDICARE

## 2025-06-18 ENCOUNTER — HOSPITAL ENCOUNTER (OUTPATIENT)
Facility: HOSPITAL | Age: 75
Setting detail: HOSPITAL OUTPATIENT SURGERY
Discharge: HOME | End: 2025-06-18
Attending: INTERNAL MEDICINE | Admitting: INTERNAL MEDICINE
Payer: MEDICARE

## 2025-06-18 VITALS
DIASTOLIC BLOOD PRESSURE: 83 MMHG | HEIGHT: 70 IN | RESPIRATION RATE: 15 BRPM | HEART RATE: 63 BPM | WEIGHT: 170 LBS | SYSTOLIC BLOOD PRESSURE: 137 MMHG | OXYGEN SATURATION: 98 % | BODY MASS INDEX: 24.34 KG/M2 | TEMPERATURE: 97.1 F

## 2025-06-18 DIAGNOSIS — Z12.11 ENCOUNTER FOR SCREENING FOR MALIGNANT NEOPLASM OF COLON: ICD-10-CM

## 2025-06-18 PROCEDURE — 25010000002 PROPOFOL 200 MG/20ML EMULSION: Performed by: NURSE ANESTHETIST, CERTIFIED REGISTERED

## 2025-06-18 PROCEDURE — 45385 COLONOSCOPY W/LESION REMOVAL: CPT | Performed by: INTERNAL MEDICINE

## 2025-06-18 PROCEDURE — 25810000003 LACTATED RINGERS PER 1000 ML: Performed by: ANESTHESIOLOGY

## 2025-06-18 PROCEDURE — 25010000002 LIDOCAINE PF 2% 2 % SOLUTION: Performed by: NURSE ANESTHETIST, CERTIFIED REGISTERED

## 2025-06-18 RX ORDER — SODIUM CHLORIDE, SODIUM LACTATE, POTASSIUM CHLORIDE, CALCIUM CHLORIDE 600; 310; 30; 20 MG/100ML; MG/100ML; MG/100ML; MG/100ML
100 INJECTION, SOLUTION INTRAVENOUS ONCE AS NEEDED
Status: DISCONTINUED | OUTPATIENT
Start: 2025-06-18 | End: 2025-06-18 | Stop reason: HOSPADM

## 2025-06-18 RX ORDER — MEPERIDINE HYDROCHLORIDE 25 MG/ML
12.5 INJECTION INTRAMUSCULAR; INTRAVENOUS; SUBCUTANEOUS
Status: DISCONTINUED | OUTPATIENT
Start: 2025-06-18 | End: 2025-06-18 | Stop reason: HOSPADM

## 2025-06-18 RX ORDER — ONDANSETRON 2 MG/ML
4 INJECTION INTRAMUSCULAR; INTRAVENOUS AS NEEDED
Status: DISCONTINUED | OUTPATIENT
Start: 2025-06-18 | End: 2025-06-18 | Stop reason: HOSPADM

## 2025-06-18 RX ORDER — LIDOCAINE HYDROCHLORIDE 20 MG/ML
INJECTION, SOLUTION EPIDURAL; INFILTRATION; INTRACAUDAL; PERINEURAL AS NEEDED
Status: DISCONTINUED | OUTPATIENT
Start: 2025-06-18 | End: 2025-06-18 | Stop reason: SURG

## 2025-06-18 RX ORDER — FENTANYL CITRATE 50 UG/ML
50 INJECTION, SOLUTION INTRAMUSCULAR; INTRAVENOUS
Status: DISCONTINUED | OUTPATIENT
Start: 2025-06-18 | End: 2025-06-18 | Stop reason: HOSPADM

## 2025-06-18 RX ORDER — SODIUM CHLORIDE 9 MG/ML
40 INJECTION, SOLUTION INTRAVENOUS AS NEEDED
Status: DISCONTINUED | OUTPATIENT
Start: 2025-06-18 | End: 2025-06-18 | Stop reason: HOSPADM

## 2025-06-18 RX ORDER — SODIUM CHLORIDE 0.9 % (FLUSH) 0.9 %
10 SYRINGE (ML) INJECTION AS NEEDED
Status: DISCONTINUED | OUTPATIENT
Start: 2025-06-18 | End: 2025-06-18 | Stop reason: HOSPADM

## 2025-06-18 RX ORDER — SODIUM CHLORIDE, SODIUM LACTATE, POTASSIUM CHLORIDE, CALCIUM CHLORIDE 600; 310; 30; 20 MG/100ML; MG/100ML; MG/100ML; MG/100ML
125 INJECTION, SOLUTION INTRAVENOUS ONCE
Status: COMPLETED | OUTPATIENT
Start: 2025-06-18 | End: 2025-06-18

## 2025-06-18 RX ORDER — PROPOFOL 10 MG/ML
INJECTION, EMULSION INTRAVENOUS CONTINUOUS PRN
Status: DISCONTINUED | OUTPATIENT
Start: 2025-06-18 | End: 2025-06-18 | Stop reason: SURG

## 2025-06-18 RX ORDER — OXYCODONE AND ACETAMINOPHEN 5; 325 MG/1; MG/1
1 TABLET ORAL ONCE AS NEEDED
Status: DISCONTINUED | OUTPATIENT
Start: 2025-06-18 | End: 2025-06-18 | Stop reason: HOSPADM

## 2025-06-18 RX ORDER — IPRATROPIUM BROMIDE AND ALBUTEROL SULFATE 2.5; .5 MG/3ML; MG/3ML
3 SOLUTION RESPIRATORY (INHALATION) ONCE AS NEEDED
Status: DISCONTINUED | OUTPATIENT
Start: 2025-06-18 | End: 2025-06-18 | Stop reason: HOSPADM

## 2025-06-18 RX ORDER — SODIUM CHLORIDE 0.9 % (FLUSH) 0.9 %
10 SYRINGE (ML) INJECTION EVERY 12 HOURS SCHEDULED
Status: DISCONTINUED | OUTPATIENT
Start: 2025-06-18 | End: 2025-06-18 | Stop reason: HOSPADM

## 2025-06-18 RX ADMIN — PROPOFOL 150 MCG/KG/MIN: 10 INJECTION, EMULSION INTRAVENOUS at 09:37

## 2025-06-18 RX ADMIN — LIDOCAINE HYDROCHLORIDE 60 MG: 20 INJECTION, SOLUTION EPIDURAL; INFILTRATION; INTRACAUDAL; PERINEURAL at 09:37

## 2025-06-18 RX ADMIN — SODIUM CHLORIDE, POTASSIUM CHLORIDE, SODIUM LACTATE AND CALCIUM CHLORIDE: 600; 310; 30; 20 INJECTION, SOLUTION INTRAVENOUS at 09:37

## 2025-06-18 NOTE — ANESTHESIA POSTPROCEDURE EVALUATION
Patient: Jem Crowder    Procedure Summary       Date: 06/18/25 Room / Location: [facility] OR  /  [facility] OR    Anesthesia Start: 0937 Anesthesia Stop: 1000    Procedure: COLONOSCOPY Diagnosis:       Encounter for screening for malignant neoplasm of colon      (Encounter for screening for malignant neoplasm of colon [Z12.11])    Surgeons: Martha Keita MD Provider: Tommy Back MD    Anesthesia Type: general ASA Status: 2            Anesthesia Type: general    Vitals  Vitals Value Taken Time   /83 06/18/25 10:29   Temp 97.1 °F (36.2 °C) 06/18/25 10:04   Pulse 63 06/18/25 10:29   Resp 15 06/18/25 10:29   SpO2 98 % 06/18/25 10:29           Post Anesthesia Care and Evaluation    Patient location during evaluation: PACU  Patient participation: complete - patient participated  Level of consciousness: awake  Pain score: 0  Pain management: adequate    Airway patency: patent  Anesthetic complications: No anesthetic complications  PONV Status: controlled  Cardiovascular status: acceptable and blood pressure returned to baseline  Respiratory status: acceptable and room air  Hydration status: acceptable    Comments: Patient comfortable with discharge at this time.

## 2025-06-18 NOTE — H&P
[facility]    Patient Identification:  Name:  Jem Crowder  Age:  75 y.o.  Sex:  male  :  1950  MRN:  [id number]   Visit Number:  [id number]  Primary Care Physician:  Marilin Ward DO       Chief complaint: Screening colonoscopy     History of presenting illness:  75 y.o. male who presents today for screening colonoscopy. Patient had a prior colonoscopy performed by Dr. Horne in  that was notable for diverticula and internal hemorrhoids., otherwise unremarkable. He denies unintentional weight loss, N/V, abdominal pain, change in bowel habits, melena, hematochezia, and BRBPR. No history of colon polyps. No family hx of colon cancer.     Review of Systems   Constitutional: Negative.    HENT: Negative.     Respiratory: Negative.     Cardiovascular: Negative.    Gastrointestinal: Negative.    All other systems reviewed and are negative.       Past Medical History:   Diagnosis Date    Coronary artery disease     Diverticulitis     Elevated cholesterol     Rosacea      Past Surgical History:   Procedure Laterality Date    CATARACT EXTRACTION Left     CATARACT EXTRACTION Right     COLONOSCOPY  10/01/2015    EYE SURGERY Right 2024    REFRACTIVE SURGERY Right 03/10/2025     Family History   Problem Relation Age of Onset    Heart disease Mother     Hypertension Mother     Lung disease Father         Black Lung    Stomach cancer Father     Cancer Brother      Social History     Socioeconomic History    Marital status:    Tobacco Use    Smoking status: Never    Smokeless tobacco: Former     Types: Chew     Quit date: 2000   Vaping Use    Vaping status: Never Used   Substance and Sexual Activity    Alcohol use: No    Drug use: No    Sexual activity: Defer       Allergies:  Penicillins    Prior to Admission Medications       Prescriptions Last Dose Informant Patient Reported? Taking?    bisacodyl (Dulcolax) 5 MG EC tablet 2025  No Yes  "   Take 1 tablet by mouth Daily As Needed for Constipation. TAKE 4 TABLETS AT 12PM    docusate sodium (COLACE) 250 MG capsule Past Week Self Yes Yes    Take 240 mg by mouth Daily.    Magnesium 100 MG capsule Past Week  Yes Yes    Take 100 mg by mouth.    metroNIDAZOLE (METROGEL) 0.75 % gel 6/17/2025  No Yes    Apply  topically to the appropriate area as directed 2 (Two) Times a Day.    Multiple Vitamin (MULTI VITAMIN MENS) tablet Past Week  Yes Yes    Take 1 tablet by mouth Daily.    polyethylene glycol (MiraLax) 17 GM/SCOOP powder 6/17/2025  No Yes    Take 17 g by mouth Daily. TAKE 15 CAPFULS MIXED IN 32 OZ OF LIQUID AT 4PM AND 6PM    simvastatin (ZOCOR) 20 MG tablet Past Week  No Yes    Take 1 tablet by mouth Every Night.    triamcinolone (KENALOG) 0.5 % ointment Past Week  No Yes    Apply 1 Application topically to the appropriate area as directed 2 (Two) Times a Day As Needed for Rash.          Hospital Scheduled Meds:    No current facility-administered medications for this encounter.      Vital Signs:  Resp:  [18] 18      06/18/25  0846   Weight: 77.1 kg (170 lb)     Body mass index is 24.39 kg/m².    Physical Exam:  Constitutional:  Alert and oriented. Well developed and well nourished, in no acute distress.  HENT:  Head: Normocephalic and atraumatic.  Mouth:  Moist mucous membranes.  OP clear, mmm  Eyes:  Conjunctivae and EOM are normal.  Pupils are equal, round, and reactive to light.  No scleral icterus.  Neck:  Neck supple.  No JVD present.    Cardiovascular:  RRR, no MRG.  Pulmonary/Chest:  CTAB, unlabored.   Abdominal:  Soft.  Bowel sounds are normal.  No distension and no tenderness.   Psychiatric:  Normal mood and affect.  Behavior is normal.  Judgment and thought content normal.                     Invalid input(s): \"PROT\"CrCl cannot be calculated (Patient's most recent lab result is older than the maximum 30 days allowed.).  No results found for: \"AMMONIA\"          No results found for: " "\"HGBA1C\"  Lab Results   Component Value Date    TSH 1.800 03/24/2025     No results found for: \"PREGTESTUR\", \"PREGSERUM\", \"HCG\", \"HCGQUANT\"  Pain Management Panel           No data to display              No results found for: \"BLOODCX\"  No results found for: \"URINECX\"  No results found for: \"WOUNDCX\"  No results found for: \"STOOLCX\"        Imaging Results (Last 7 Days)       ** No results found for the last 168 hours. **              Assessment and Plan:    Proceed with colonoscopy for screening purposes.     Kelly Eden PA-C  06/18/25  08:47 EDT    "

## 2025-06-18 NOTE — ANESTHESIA PREPROCEDURE EVALUATION
Anesthesia Evaluation     Patient summary reviewed and Nursing notes reviewed   no history of anesthetic complications:   NPO Solid Status: > 8 hours  NPO Liquid Status: > 8 hours           Airway   Mallampati: II  TM distance: >3 FB  Neck ROM: full  No difficulty expected  Dental    (+) poor dentition        Pulmonary - negative pulmonary ROS and normal exam    breath sounds clear to auscultation  Cardiovascular - normal exam    Rhythm: regular  Rate: normal    (+) CAD, hyperlipidemia      Neuro/Psych- negative ROS  GI/Hepatic/Renal/Endo - negative ROS     Musculoskeletal (-) negative ROS    Abdominal  - normal exam   Substance History - negative use     OB/GYN negative ob/gyn ROS         Other - negative ROS           Phys Exam Other: Missing lots of teeth, one on the upper left is very loose, patient aware.              Anesthesia Plan    ASA 2     general   total IV anesthesia  intravenous induction     Anesthetic plan, risks, benefits, and alternatives have been provided, discussed and informed consent has been obtained with: patient.    Use of blood products discussed with patient  Consented to blood products.        CODE STATUS:

## 2025-06-19 ENCOUNTER — RESULTS FOLLOW-UP (OUTPATIENT)
Dept: PERIOP | Facility: HOSPITAL | Age: 75
End: 2025-06-19
Payer: MEDICARE

## 2025-06-19 LAB — REF LAB TEST METHOD: NORMAL

## 2025-06-19 NOTE — LETTER
June 19, 2025    Jem Crowder  [address] [facility] [place] [address]      Jem,     At the time of your recent colonoscopy, polyp was removed from the colon. The polyp was a tubular adenoma. Tubular adenomas are considered precancerous. Because of this you will need repeat colonoscopy in 5 years. However, you will be 80 at that time and may opt out of colonoscopies.       Thank you,         Martha Keita MD

## 2025-06-19 NOTE — PROGRESS NOTES
At the time of your recent colonoscopy, polyp was removed from the colon.  The polyp was a tubular adenoma.  Tubular adenomas are considered precancerous.  Because of this you will need repeat colonoscopy in 5 years.  However, you will be 80 at that time and may opt out of colonoscopies.

## (undated) DEVICE — TUBING, SUCTION, 1/4" X 20', STRAIGHT: Brand: MEDLINE INDUSTRIES, INC.

## (undated) DEVICE — ENDOGATOR TUBING FOR ENDOGATOR EGP-100 IRRIGATION PUMP,OLYMPUS OFP PUMP, OLYMPUS AFU-100 PUMP AND ERBE EIP2 PUMP: Brand: ENDOGATOR

## (undated) DEVICE — CONN Y IRR DISP 1P/U

## (undated) DEVICE — ENDOGATOR AUXILIARY WATER JET CONNECTOR: Brand: ENDOGATOR

## (undated) DEVICE — DEFENDO AIR WATER SUCTION AND BIOPSY VALVE KIT FOR  OLYMPUS: Brand: DEFENDO AIR/WATER/SUCTION AND BIOPSY VALVE

## (undated) DEVICE — Device